# Patient Record
Sex: MALE | Race: WHITE | NOT HISPANIC OR LATINO
[De-identification: names, ages, dates, MRNs, and addresses within clinical notes are randomized per-mention and may not be internally consistent; named-entity substitution may affect disease eponyms.]

---

## 2017-08-30 VITALS
TEMPERATURE: 98 F | OXYGEN SATURATION: 97 % | DIASTOLIC BLOOD PRESSURE: 75 MMHG | HEIGHT: 70 IN | WEIGHT: 190.04 LBS | HEART RATE: 78 BPM | RESPIRATION RATE: 16 BRPM | SYSTOLIC BLOOD PRESSURE: 120 MMHG

## 2017-08-31 ENCOUNTER — RESULT REVIEW (OUTPATIENT)
Age: 37
End: 2017-08-31

## 2017-08-31 ENCOUNTER — INPATIENT (INPATIENT)
Facility: HOSPITAL | Age: 37
LOS: 3 days | Discharge: HOME | DRG: 454 | End: 2017-09-04
Attending: SPECIALIST | Admitting: SPECIALIST
Payer: COMMERCIAL

## 2017-08-31 DIAGNOSIS — Z98.890 OTHER SPECIFIED POSTPROCEDURAL STATES: Chronic | ICD-10-CM

## 2017-08-31 DIAGNOSIS — M48.07 SPINAL STENOSIS, LUMBOSACRAL REGION: ICD-10-CM

## 2017-08-31 DIAGNOSIS — F41.1 GENERALIZED ANXIETY DISORDER: ICD-10-CM

## 2017-08-31 DIAGNOSIS — M54.30 SCIATICA, UNSPECIFIED SIDE: ICD-10-CM

## 2017-08-31 DIAGNOSIS — Z90.49 ACQUIRED ABSENCE OF OTHER SPECIFIED PARTS OF DIGESTIVE TRACT: Chronic | ICD-10-CM

## 2017-08-31 DIAGNOSIS — D69.3 IMMUNE THROMBOCYTOPENIC PURPURA: ICD-10-CM

## 2017-08-31 LAB
ANION GAP SERPL CALC-SCNC: 11 MMOL/L — SIGNIFICANT CHANGE UP (ref 5–17)
BASOPHILS NFR BLD AUTO: 0.1 % — SIGNIFICANT CHANGE UP (ref 0–2)
BUN SERPL-MCNC: 10 MG/DL — SIGNIFICANT CHANGE UP (ref 7–23)
CALCIUM SERPL-MCNC: 8.5 MG/DL — SIGNIFICANT CHANGE UP (ref 8.4–10.5)
CHLORIDE SERPL-SCNC: 98 MMOL/L — SIGNIFICANT CHANGE UP (ref 96–108)
CO2 SERPL-SCNC: 25 MMOL/L — SIGNIFICANT CHANGE UP (ref 22–31)
CREAT SERPL-MCNC: 0.7 MG/DL — SIGNIFICANT CHANGE UP (ref 0.5–1.3)
EOSINOPHIL NFR BLD AUTO: 0.3 % — SIGNIFICANT CHANGE UP (ref 0–6)
GLUCOSE SERPL-MCNC: 125 MG/DL — HIGH (ref 70–99)
HCT VFR BLD CALC: 39.9 % — SIGNIFICANT CHANGE UP (ref 39–50)
HGB BLD-MCNC: 14.4 G/DL — SIGNIFICANT CHANGE UP (ref 13–17)
LYMPHOCYTES # BLD AUTO: 8.5 % — LOW (ref 13–44)
MCHC RBC-ENTMCNC: 29.3 PG — SIGNIFICANT CHANGE UP (ref 27–34)
MCHC RBC-ENTMCNC: 36.1 G/DL — HIGH (ref 32–36)
MCV RBC AUTO: 81.1 FL — SIGNIFICANT CHANGE UP (ref 80–100)
MONOCYTES NFR BLD AUTO: 1.5 % — LOW (ref 2–14)
NEUTROPHILS NFR BLD AUTO: 89.6 % — HIGH (ref 43–77)
PLATELET # BLD AUTO: 169 K/UL — SIGNIFICANT CHANGE UP (ref 150–400)
POTASSIUM SERPL-MCNC: 4.7 MMOL/L — SIGNIFICANT CHANGE UP (ref 3.5–5.3)
POTASSIUM SERPL-SCNC: 4.7 MMOL/L — SIGNIFICANT CHANGE UP (ref 3.5–5.3)
RBC # BLD: 4.92 M/UL — SIGNIFICANT CHANGE UP (ref 4.2–5.8)
RBC # FLD: 12.8 % — SIGNIFICANT CHANGE UP (ref 10.3–16.9)
SODIUM SERPL-SCNC: 134 MMOL/L — LOW (ref 135–145)
WBC # BLD: 18.1 K/UL — HIGH (ref 3.8–10.5)
WBC # FLD AUTO: 18.1 K/UL — HIGH (ref 3.8–10.5)

## 2017-08-31 PROCEDURE — 15734 MUSCLE-SKIN GRAFT TRUNK: CPT | Mod: LT

## 2017-08-31 RX ORDER — MORPHINE SULFATE 50 MG/1
2 CAPSULE, EXTENDED RELEASE ORAL
Qty: 0 | Refills: 0 | Status: DISCONTINUED | OUTPATIENT
Start: 2017-08-31 | End: 2017-09-01

## 2017-08-31 RX ORDER — HYDROMORPHONE HYDROCHLORIDE 2 MG/ML
0.5 INJECTION INTRAMUSCULAR; INTRAVENOUS; SUBCUTANEOUS
Qty: 0 | Refills: 0 | Status: DISCONTINUED | OUTPATIENT
Start: 2017-08-31 | End: 2017-09-01

## 2017-08-31 RX ORDER — CEFAZOLIN SODIUM 1 G
2000 VIAL (EA) INJECTION EVERY 8 HOURS
Qty: 0 | Refills: 0 | Status: COMPLETED | OUTPATIENT
Start: 2017-08-31 | End: 2017-09-01

## 2017-08-31 RX ORDER — HYDROMORPHONE HYDROCHLORIDE 2 MG/ML
30 INJECTION INTRAMUSCULAR; INTRAVENOUS; SUBCUTANEOUS
Qty: 0 | Refills: 0 | Status: DISCONTINUED | OUTPATIENT
Start: 2017-08-31 | End: 2017-09-01

## 2017-08-31 RX ORDER — MORPHINE SULFATE 50 MG/1
4 CAPSULE, EXTENDED RELEASE ORAL
Qty: 0 | Refills: 0 | Status: DISCONTINUED | OUTPATIENT
Start: 2017-08-31 | End: 2017-09-01

## 2017-08-31 RX ORDER — ONDANSETRON 8 MG/1
4 TABLET, FILM COATED ORAL EVERY 6 HOURS
Qty: 0 | Refills: 0 | Status: DISCONTINUED | OUTPATIENT
Start: 2017-08-31 | End: 2017-09-04

## 2017-08-31 RX ORDER — BUPIVACAINE 13.3 MG/ML
20 INJECTION, SUSPENSION, LIPOSOMAL INFILTRATION ONCE
Qty: 0 | Refills: 0 | Status: DISCONTINUED | OUTPATIENT
Start: 2017-08-31 | End: 2017-08-31

## 2017-08-31 RX ORDER — SODIUM CHLORIDE 9 MG/ML
1000 INJECTION, SOLUTION INTRAVENOUS
Qty: 0 | Refills: 0 | Status: DISCONTINUED | OUTPATIENT
Start: 2017-08-31 | End: 2017-09-03

## 2017-08-31 RX ORDER — ACETAMINOPHEN 500 MG
975 TABLET ORAL EVERY 8 HOURS
Qty: 0 | Refills: 0 | Status: DISCONTINUED | OUTPATIENT
Start: 2017-08-31 | End: 2017-09-04

## 2017-08-31 RX ORDER — NALOXONE HYDROCHLORIDE 4 MG/.1ML
0.1 SPRAY NASAL
Qty: 0 | Refills: 0 | Status: DISCONTINUED | OUTPATIENT
Start: 2017-08-31 | End: 2017-09-04

## 2017-08-31 RX ADMIN — HYDROMORPHONE HYDROCHLORIDE 30 MILLILITER(S): 2 INJECTION INTRAMUSCULAR; INTRAVENOUS; SUBCUTANEOUS at 19:24

## 2017-08-31 RX ADMIN — HYDROMORPHONE HYDROCHLORIDE 0.5 MILLIGRAM(S): 2 INJECTION INTRAMUSCULAR; INTRAVENOUS; SUBCUTANEOUS at 19:22

## 2017-08-31 RX ADMIN — HYDROMORPHONE HYDROCHLORIDE 0.5 MILLIGRAM(S): 2 INJECTION INTRAMUSCULAR; INTRAVENOUS; SUBCUTANEOUS at 22:45

## 2017-08-31 RX ADMIN — Medication 975 MILLIGRAM(S): at 22:48

## 2017-08-31 RX ADMIN — SODIUM CHLORIDE 80 MILLILITER(S): 9 INJECTION, SOLUTION INTRAVENOUS at 19:32

## 2017-08-31 RX ADMIN — HYDROMORPHONE HYDROCHLORIDE 0.5 MILLIGRAM(S): 2 INJECTION INTRAMUSCULAR; INTRAVENOUS; SUBCUTANEOUS at 19:00

## 2017-08-31 NOTE — H&P ADULT - PROBLEM SELECTOR PLAN 1
Admit to Orthopedics  Medically optimized by Dr. Hannah and hematology Dr Strickland  For Anterior to posterior spinal fusion L5-S1 with bone graft today

## 2017-08-31 NOTE — H&P ADULT - HISTORY OF PRESENT ILLNESS
37M with low back pain x  Ambulates with   Has hematologist for history of ITP which is noted to be resolved due to decadron tx.  Presents today for Ant-Post spinal fusion with instrumentation. 37M with low back pain x 8 years which that began spontaneously and without accident or trauma. Pt reports that he has undergone multiple epidural injections over the years without lasting pain relief. He eventually underwent discectomy and laminectomy for a herniated disc in May 2016 with some improvement of his pain. However, his pain continues to progressively worsen without improvement and has become increasingly unresponsive to conservative management. Pt states the pain is constant without radiation and is exacerbated with prolonged sitting or standing. Positional changes also exacerbate pain. Pt ambulates without assistance at home. Denies weakness, numbness/tingling of extremities, F/C/N/V, CP, SOB today.     Of note, pt has hematologist for history of ITP which is noted to be resolved due to pre-operative high dose steroids (decadron) tx.    Presents today for Ant-Post spinal fusion with instrumentation.

## 2017-08-31 NOTE — PROVIDER CONTACT NOTE (OTHER) - ASSESSMENT
Pt's bowel sounds were hypoactive upon ausculation and is s/p ALIF L5-S1. Pt requested something to drink. Tylenol 975mg po also ordered for 10PM.

## 2017-08-31 NOTE — CONSULT NOTE ADULT - SUBJECTIVE AND OBJECTIVE BOX
Pain Management Consult Note - [facility] [phone]    Chief Complaint:  Back pain    HPI:  38 y/o male with back pain going for ASF L5-S1 and PSF L5-S1 today.  States he was taking percocet 5/325 2-3 times/week.  States the pain is in the back and is nonradiating.  Denies weakness or numbness in the lower extremities.      Pain is ___ sharp ____dull ___burning __x_achy ___ Intensity: ____ mild ___mod ___severe     Location ____surgical site ____cervical __x___lumbar ____abd ____upper ext____lower ext    Worse with __x__activity __x__movement _____physical therapy___ Rest    Improved with __x__medication ___x_rest ____physical therapy    ROS: Const:  _-__febrile   Eyes:___ENT:___CV: __-_chest pain  Resp: __-__sob  GI:_-__nausea __-_vomiting -___abd pain _+__npo ___clears __full diet __bm  :_-__ Musk: __+_pain ___spasm  Skin:___ Neuro:  _-__rqtgjsmu__-_wimvduzxv___- numbness __-_weakness ___paresth  Psych:__anxiety  Endo:_-__ Heme:_+ ITP__Allergy:___Tylenol with codeine-abd pain______, ___all others reviewed and negative    PAST MEDICAL & SURGICAL HISTORY:  Purpura, idiopathic thrombocytopenia  Lumbosacral stenosis  History of microdiscectomy  History of cholecystectomy      SH: ___Tobacco   ___Alcohol                          FH:FAMILY HISTORY:          T(C): --  HR: --  BP: --  RR: --  SpO2: --  Wt(kg): --    T(C): --  HR: --  BP: --  RR: --  SpO2: --  Wt(kg): --    T(C): --  HR: --  BP: --  RR: --  SpO2: --  Wt(kg): --    PHYSICAL EXAM:  Gen Appearance: _x__no acute distress _x__appropriate        Neuro: ___SILT feet__x__ EOM Intact Psych: AAOX_3_, _x__mood/affect appropriate        Eyes: x___conjunctiva WNL  ___x__ Pupils equal and round        ENT: _x__ears and nose atraumatic__x_ Hearing grossly intact        Neck: ___trachea midline, no visible masses ___thyroid without palpable mass    Resp: _x__Nml WOB____No tactile fremitus ___clear to auscultation    Cardio: _x__extremities free from edema ____pedal pulses palpable    GI/Abdomen: ___soft _____ Nontender______Nondistended_____HSM    Lymphatic: ___no palpable nodes in neck  ___no palpable nodes calves and feet    Skin/Wound: ___Incision, ___Dressing c/d/i,   ____surrounding tissues soft,  ___drain/chest tube present____    Muscular: EHL ___/5  Gastrocnemius___/5    _x__absent clubbing/cyanosis      ASSESSMENT: This is a 37y old Male with a history of   LUMBO SACRAL STENOSIS  No h/o HF  Purpura, idiopathic thrombocytopenia  Anticipatory anxiety: Anticipatory anxiety  Sciatica, unspecified laterality: Sciatica, unspecified laterality  Purpura, idiopathic thrombocytopenia: Purpura, idiopathic thrombocytopenia  Lumbosacral stenosis: Lumbosacral stenosis  History of microdiscectomy  History of cholecystectomy  and back pain going for ASF L5-S1 and PSF L5-S1 today.    Recommended Treatment PLAN:  1.  Tylenol 975 mg po q8h  2.  Dilaudid PCA 0/0.2mg/6min with 0.5 mg IV q2h prn [facility] [phone]    Chief Complaint:  Back pain    HPI:  38 y/o male with back pain going for ASF L5-S1 and PSF L5-S1 today.  States he was taking percocet 5/325 2-3 times/week.  States the pain is in the back and is nonradiating.  Denies weakness or numbness in the lower extremities.      Pain is ___ sharp ____dull ___burning __x_achy ___ Intensity: ____ mild ___mod ___severe     Location ____surgical site ____cervical __x___lumbar ____abd ____upper ext____lower ext    Worse with __x__activity __x__movement _____physical therapy___ Rest    Improved with __x__medication ___x_rest ____physical therapy    ROS: Const:  _-__febrile   Eyes:___ENT:___CV: __-_chest pain  Resp: __-__sob  GI:_-__nausea __-_vomiting -___abd pain _+__npo ___clears __full diet __bm  :_-__ Musk: __+_pain ___spasm  Skin:___ Neuro:  _-__rnuzuxgc__-_vxggtbzxi___- numbness __-_weakness ___paresth  Psych:__anxiety  Endo:_-__ Heme:_+ ITP__Allergy:___Tylenol with codeine-abd pain______, ___all others reviewed and negative    PAST MEDICAL & SURGICAL HISTORY:  Purpura, idiopathic thrombocytopenia  Lumbosacral stenosis  History of microdiscectomy  History of cholecystectomy      SH: denies___Tobacco   denies___Alcohol                          FH:FAMILY HISTORY:  None          T(C): --97.8 oral  HR: --78  BP: --120/75  RR: --16  SpO2: --97%  Wt(kg): --    T(C): --  HR: --  BP: --  RR: --  SpO2: --  Wt(kg): --    T(C): --  HR: --  BP: --  RR: --  SpO2: --  Wt(kg): --    PHYSICAL EXAM:  Gen Appearance: _x__no acute distress _x__appropriate        Neuro: ___SILT feet__x__ EOM Intact Psych: AAOX_3_, _x__mood/affect appropriate        Eyes: x___conjunctiva WNL  ___x__ Pupils equal and round        ENT: _x__ears and nose atraumatic__x_ Hearing grossly intact        Neck: ___trachea midline, no visible masses ___thyroid without palpable mass    Resp: _x__Nml WOB____No tactile fremitus ___clear to auscultation    Cardio: _x__extremities free from edema __5__pedal pulses palpable    GI/Abdomen: _x__soft __x__ Nontender______Nondistended_____HSM    Lymphatic: ___no palpable nodes in neck  ___no palpable nodes calves and feet    Skin/Wound: ___Incision, ___Dressing c/d/i,   ____surrounding tissues soft,  ___drain/chest tube present____    Muscular: EHL _5__/5  Gastrocnemius___/5    _x__absent clubbing/cyanosis      ASSESSMENT: This is a 37y old Male with a history of   LUMBO SACRAL STENOSIS  No h/o HF  Purpura, idiopathic thrombocytopenia  Anticipatory anxiety: Anticipatory anxiety  Sciatica, unspecified laterality: Sciatica, unspecified laterality  Purpura, idiopathic thrombocytopenia: Purpura, idiopathic thrombocytopenia  Lumbosacral stenosis: Lumbosacral stenosis  History of microdiscectomy  History of cholecystectomy  and back pain going for ASF L5-S1 and PSF L5-S1 today.    Recommended Treatment PLAN:  1.  Tylenol 975 mg po q8h  2.  Dilaudid PCA 0/0.2mg/6min with 0.5 mg IV q2h prn

## 2017-08-31 NOTE — H&P ADULT - NSHPSOCIALHISTORY_GEN_ALL_CORE
Quit smoking cigarettes in 2013  Moderate alcohol use ETOH: Moderate alcohol use, 2-3 glass wine/week.  Tobacco: Former light social smoker, Quit smoking cigarettes in 2013  Drugs: Denies

## 2017-08-31 NOTE — H&P ADULT - NSHPPHYSICALEXAM_GEN_ALL_CORE
PE: Normal head, atraumatic. Normal skin, no rashes/lesions/erythema.  MSK: Decreased ROM to lumbar spine secondary to pain.   Rest of PE per medical clearance. Gen: Pt seated in chair in NAD   Skin: Warm, color normal, no wounds, rashes.  Back: tenderness to palpation to lumbar spine.  Extremities: No clubbing, no cyanosis, no edema.  Musculoskeletal: Decreased ROM secondary to pain, lumbar spine. Sensation intact to light touch to bilateral LE distally. Motor Strength 5/5 to TA/GS/EHL/FHL bilaterally.  Vascular: DP/PT 2+, Brisk cap refill, Toes wwp     Remainder of exam as per medical clearance note

## 2017-08-31 NOTE — PROGRESS NOTE ADULT - SUBJECTIVE AND OBJECTIVE BOX
Ortho Post Op Check    Procedure: Ant. Post L5-S1 fusion  Surgeon: Aisha  Laterality:    Pt comfortable without complaints, pain controlled  Denies CP, SOB, N/V, numbness/tingling     Vital Signs Last 24 Hrs  T(C): 36.9 (08-31-17 @ 21:40), Max: 37.1 (08-31-17 @ 21:00)  T(F): 98.4 (08-31-17 @ 21:40), Max: 98.7 (08-31-17 @ 21:00)  HR: 98 (08-31-17 @ 21:40) (86 - 98)  BP: 154/83 (08-31-17 @ 21:40) (121/80 - 154/83)  BP(mean): 100 (08-31-17 @ 19:45) (100 - 114)  RR: 12 (08-31-17 @ 21:40) (12 - 18)  SpO2: 98% (08-31-17 @ 21:40) (10% - 100%)  AVSS    General: Pt Alert and oriented, NAD  NAD, non labored respirations  Affected extremity:          Dressing: clean/dry/intact          Drains:1         Sensation: [x ] intact to light touch  [ ] decreased                              Vascular: [x ] warm well perfused; capillary refill <3 seconds          Motor exam: [x ]         [ ] Upper extremity                   Nae (c5)   Bi(c5/6)   WE(c6)   EE(c7)    (c8)                                                R           5              5            5             5             5                                               L            5              5            5             5            5         [x ] Lower extremity                   IP(L2)     Q(L3)     TA(L4)     EHL(L5)     GS(s1)                                                 R          5           5          5            5              5                                               L           5           5         5             5              5                            14.4   18.1  )-----------( 169      ( 31 Aug 2017 18:43 )             39.9   31 Aug 2017 18:43    134    |  98     |  10     ----------------------------<  125    4.7     |  25     |  0.70     Ca    8.5        31 Aug 2017 18:43        Post-op X-Ray: Implant in good position    A/P: 37yMale POD#0 s/p   - Stable  - Pain Control  - DVT ppx: SCDs  - Post op abx: Ancef  - PT, WBS: WBAT    Ortho Pager [phone]

## 2017-08-31 NOTE — H&P ADULT - NSHPLABSRESULTS_GEN_ALL_CORE
Preop CBC, BMP, PT/PTT/INR, UA within normal limits- reviewed by medical clearance.   Preop EKG: SR, RBBB, reviewed by medical clearance.

## 2017-08-31 NOTE — CONSULT NOTE ADULT - SUBJECTIVE AND OBJECTIVE BOX
HPI:  37 year old male  with low back pain x years with sciatica.  MRI confirmed diagnosis lumbar spinal stenosis.  Patient ambulates with difficulty and symptoms worse after prolonged imobility and in car.     Has hematologist for history of ITP which is noted to be resolved with  decadron rx.  Presents today for Ant-Post spinal fusion with instrumentation. (31 Aug 2017 08:10)      PAST MEDICAL & SURGICAL HISTORY:    Purpura, idiopathic thrombocytopenia  Lumbosacral stenosis  Denies DM HBP PUD ASTHMA  allergic rhinitis  History of microdiscectomy  History of cholecystectomy      REVIEW OF SYSTEMS    General:  normal	  Skin/Breast: normal  Ophthalmologic: negative  ENMT:	normal  Respiratory and Thorax: normal  Cardiovascular:	normal  Gastrointestinal:	normal  Genitourinary:	normal  Musculoskeletal: normal	   Neurological:	low back pain  Psychiatric:	normal  Hematology/Lymphatics:	negative  Endocrine:	negative  Allergic/Immunologic:	negative      MEDICATIONS      Percocet 5/325 oral tablet: 1 tab(s) orally every 6 hours, As Needed      Allergies    No Known Allergies    SOCIAL HISTORY: cigs quit 2013  alcohol socially    FAMILY HISTORY: non contributory      PHYSICAL EXAM:     Vital Signs Last 24 Hrs  T(C): --  T(F): --98.6  HR: --72  BP: --120/80  BP(mean): --  RR: --16  SpO2: --    Constitutional: WDWNM in NAD  Eyes: conj pink  ENMT: negative  Neck: supple  Breasts: not examined   Back: negative  Respiratory: clear to P&A  Cardiovascular: no MRGT or H  Gastrointestinal: normal bowel sounds  Genitourinary: neg  Rectal: not examined  Extremities: normal  Vascular: normal  Neurological: normal  Skin: negative  Lymph Nodes: negative  Musculoskeletal:  normal  Psychiatric: anxiety

## 2017-09-01 ENCOUNTER — TRANSCRIPTION ENCOUNTER (OUTPATIENT)
Age: 37
End: 2017-09-01

## 2017-09-01 DIAGNOSIS — F41.9 ANXIETY DISORDER, UNSPECIFIED: ICD-10-CM

## 2017-09-01 LAB
ANION GAP SERPL CALC-SCNC: 13 MMOL/L — SIGNIFICANT CHANGE UP (ref 5–17)
BASOPHILS NFR BLD AUTO: 0 % — SIGNIFICANT CHANGE UP (ref 0–2)
BUN SERPL-MCNC: 14 MG/DL — SIGNIFICANT CHANGE UP (ref 7–23)
CALCIUM SERPL-MCNC: 8.9 MG/DL — SIGNIFICANT CHANGE UP (ref 8.4–10.5)
CHLORIDE SERPL-SCNC: 95 MMOL/L — LOW (ref 96–108)
CO2 SERPL-SCNC: 25 MMOL/L — SIGNIFICANT CHANGE UP (ref 22–31)
CREAT SERPL-MCNC: 0.8 MG/DL — SIGNIFICANT CHANGE UP (ref 0.5–1.3)
EOSINOPHIL NFR BLD AUTO: 0 % — SIGNIFICANT CHANGE UP (ref 0–6)
GLUCOSE SERPL-MCNC: 147 MG/DL — HIGH (ref 70–99)
GRAM STN FLD: SIGNIFICANT CHANGE UP
HCT VFR BLD CALC: 34.5 % — LOW (ref 39–50)
HGB BLD-MCNC: 12.5 G/DL — LOW (ref 13–17)
LYMPHOCYTES # BLD AUTO: 4.9 % — LOW (ref 13–44)
MCHC RBC-ENTMCNC: 29.1 PG — SIGNIFICANT CHANGE UP (ref 27–34)
MCHC RBC-ENTMCNC: 36.2 G/DL — HIGH (ref 32–36)
MCV RBC AUTO: 80.4 FL — SIGNIFICANT CHANGE UP (ref 80–100)
MONOCYTES NFR BLD AUTO: 9 % — SIGNIFICANT CHANGE UP (ref 2–14)
NEUTROPHILS NFR BLD AUTO: 86.1 % — HIGH (ref 43–77)
PLATELET # BLD AUTO: 204 K/UL — SIGNIFICANT CHANGE UP (ref 150–400)
POTASSIUM SERPL-MCNC: 4.3 MMOL/L — SIGNIFICANT CHANGE UP (ref 3.5–5.3)
POTASSIUM SERPL-SCNC: 4.3 MMOL/L — SIGNIFICANT CHANGE UP (ref 3.5–5.3)
RBC # BLD: 4.29 M/UL — SIGNIFICANT CHANGE UP (ref 4.2–5.8)
RBC # FLD: 12.8 % — SIGNIFICANT CHANGE UP (ref 10.3–16.9)
SODIUM SERPL-SCNC: 133 MMOL/L — LOW (ref 135–145)
SPECIMEN SOURCE: SIGNIFICANT CHANGE UP
WBC # BLD: 17.9 K/UL — HIGH (ref 3.8–10.5)
WBC # FLD AUTO: 17.9 K/UL — HIGH (ref 3.8–10.5)

## 2017-09-01 RX ORDER — OXYCODONE HYDROCHLORIDE 5 MG/1
5 TABLET ORAL EVERY 4 HOURS
Qty: 0 | Refills: 0 | Status: DISCONTINUED | OUTPATIENT
Start: 2017-09-01 | End: 2017-09-04

## 2017-09-01 RX ORDER — CYCLOBENZAPRINE HYDROCHLORIDE 10 MG/1
10 TABLET, FILM COATED ORAL THREE TIMES A DAY
Qty: 0 | Refills: 0 | Status: DISCONTINUED | OUTPATIENT
Start: 2017-09-01 | End: 2017-09-01

## 2017-09-01 RX ORDER — HYDROMORPHONE HYDROCHLORIDE 2 MG/ML
1 INJECTION INTRAMUSCULAR; INTRAVENOUS; SUBCUTANEOUS ONCE
Qty: 0 | Refills: 0 | Status: DISCONTINUED | OUTPATIENT
Start: 2017-09-01 | End: 2017-09-01

## 2017-09-01 RX ORDER — DIAZEPAM 5 MG
5 TABLET ORAL EVERY 8 HOURS
Qty: 0 | Refills: 0 | Status: DISCONTINUED | OUTPATIENT
Start: 2017-09-01 | End: 2017-09-04

## 2017-09-01 RX ORDER — SIMETHICONE 80 MG/1
80 TABLET, CHEWABLE ORAL EVERY 8 HOURS
Qty: 0 | Refills: 0 | Status: DISCONTINUED | OUTPATIENT
Start: 2017-09-01 | End: 2017-09-04

## 2017-09-01 RX ORDER — MORPHINE SULFATE 50 MG/1
15 CAPSULE, EXTENDED RELEASE ORAL EVERY 12 HOURS
Qty: 0 | Refills: 0 | Status: DISCONTINUED | OUTPATIENT
Start: 2017-09-01 | End: 2017-09-04

## 2017-09-01 RX ORDER — HYDROMORPHONE HYDROCHLORIDE 2 MG/ML
1 INJECTION INTRAMUSCULAR; INTRAVENOUS; SUBCUTANEOUS
Qty: 0 | Refills: 0 | Status: DISCONTINUED | OUTPATIENT
Start: 2017-09-01 | End: 2017-09-04

## 2017-09-01 RX ORDER — METOCLOPRAMIDE HCL 10 MG
5 TABLET ORAL EVERY 6 HOURS
Qty: 0 | Refills: 0 | Status: DISCONTINUED | OUTPATIENT
Start: 2017-09-01 | End: 2017-09-04

## 2017-09-01 RX ORDER — OXYCODONE HYDROCHLORIDE 5 MG/1
10 TABLET ORAL EVERY 4 HOURS
Qty: 0 | Refills: 0 | Status: DISCONTINUED | OUTPATIENT
Start: 2017-09-01 | End: 2017-09-04

## 2017-09-01 RX ORDER — MORPHINE SULFATE 50 MG/1
4 CAPSULE, EXTENDED RELEASE ORAL ONCE
Qty: 0 | Refills: 0 | Status: DISCONTINUED | OUTPATIENT
Start: 2017-09-01 | End: 2017-09-01

## 2017-09-01 RX ORDER — METOCLOPRAMIDE HCL 10 MG
5 TABLET ORAL EVERY 6 HOURS
Qty: 0 | Refills: 0 | Status: DISCONTINUED | OUTPATIENT
Start: 2017-09-01 | End: 2017-09-01

## 2017-09-01 RX ADMIN — HYDROMORPHONE HYDROCHLORIDE 1 MILLIGRAM(S): 2 INJECTION INTRAMUSCULAR; INTRAVENOUS; SUBCUTANEOUS at 07:14

## 2017-09-01 RX ADMIN — HYDROMORPHONE HYDROCHLORIDE 1 MILLIGRAM(S): 2 INJECTION INTRAMUSCULAR; INTRAVENOUS; SUBCUTANEOUS at 11:56

## 2017-09-01 RX ADMIN — Medication 975 MILLIGRAM(S): at 13:25

## 2017-09-01 RX ADMIN — HYDROMORPHONE HYDROCHLORIDE 1 MILLIGRAM(S): 2 INJECTION INTRAMUSCULAR; INTRAVENOUS; SUBCUTANEOUS at 09:29

## 2017-09-01 RX ADMIN — HYDROMORPHONE HYDROCHLORIDE 1 MILLIGRAM(S): 2 INJECTION INTRAMUSCULAR; INTRAVENOUS; SUBCUTANEOUS at 13:39

## 2017-09-01 RX ADMIN — SODIUM CHLORIDE 80 MILLILITER(S): 9 INJECTION, SOLUTION INTRAVENOUS at 18:30

## 2017-09-01 RX ADMIN — Medication 975 MILLIGRAM(S): at 21:15

## 2017-09-01 RX ADMIN — MORPHINE SULFATE 15 MILLIGRAM(S): 50 CAPSULE, EXTENDED RELEASE ORAL at 10:10

## 2017-09-01 RX ADMIN — MORPHINE SULFATE 4 MILLIGRAM(S): 50 CAPSULE, EXTENDED RELEASE ORAL at 05:52

## 2017-09-01 RX ADMIN — HYDROMORPHONE HYDROCHLORIDE 1 MILLIGRAM(S): 2 INJECTION INTRAMUSCULAR; INTRAVENOUS; SUBCUTANEOUS at 14:35

## 2017-09-01 RX ADMIN — Medication 5 MILLIGRAM(S): at 02:20

## 2017-09-01 RX ADMIN — HYDROMORPHONE HYDROCHLORIDE 1 MILLIGRAM(S): 2 INJECTION INTRAMUSCULAR; INTRAVENOUS; SUBCUTANEOUS at 08:05

## 2017-09-01 RX ADMIN — Medication 100 MILLIGRAM(S): at 00:36

## 2017-09-01 RX ADMIN — MORPHINE SULFATE 15 MILLIGRAM(S): 50 CAPSULE, EXTENDED RELEASE ORAL at 21:16

## 2017-09-01 RX ADMIN — HYDROMORPHONE HYDROCHLORIDE 1 MILLIGRAM(S): 2 INJECTION INTRAMUSCULAR; INTRAVENOUS; SUBCUTANEOUS at 18:18

## 2017-09-01 RX ADMIN — MORPHINE SULFATE 15 MILLIGRAM(S): 50 CAPSULE, EXTENDED RELEASE ORAL at 22:16

## 2017-09-01 RX ADMIN — SODIUM CHLORIDE 80 MILLILITER(S): 9 INJECTION, SOLUTION INTRAVENOUS at 06:05

## 2017-09-01 RX ADMIN — MORPHINE SULFATE 15 MILLIGRAM(S): 50 CAPSULE, EXTENDED RELEASE ORAL at 11:00

## 2017-09-01 RX ADMIN — OXYCODONE HYDROCHLORIDE 10 MILLIGRAM(S): 5 TABLET ORAL at 20:46

## 2017-09-01 RX ADMIN — OXYCODONE HYDROCHLORIDE 10 MILLIGRAM(S): 5 TABLET ORAL at 10:00

## 2017-09-01 RX ADMIN — HYDROMORPHONE HYDROCHLORIDE 0.5 MILLIGRAM(S): 2 INJECTION INTRAMUSCULAR; INTRAVENOUS; SUBCUTANEOUS at 05:52

## 2017-09-01 RX ADMIN — HYDROMORPHONE HYDROCHLORIDE 1 MILLIGRAM(S): 2 INJECTION INTRAMUSCULAR; INTRAVENOUS; SUBCUTANEOUS at 18:48

## 2017-09-01 RX ADMIN — Medication 975 MILLIGRAM(S): at 05:04

## 2017-09-01 RX ADMIN — OXYCODONE HYDROCHLORIDE 10 MILLIGRAM(S): 5 TABLET ORAL at 13:24

## 2017-09-01 RX ADMIN — MORPHINE SULFATE 4 MILLIGRAM(S): 50 CAPSULE, EXTENDED RELEASE ORAL at 05:26

## 2017-09-01 RX ADMIN — HYDROMORPHONE HYDROCHLORIDE 1 MILLIGRAM(S): 2 INJECTION INTRAMUSCULAR; INTRAVENOUS; SUBCUTANEOUS at 09:14

## 2017-09-01 RX ADMIN — HYDROMORPHONE HYDROCHLORIDE 1 MILLIGRAM(S): 2 INJECTION INTRAMUSCULAR; INTRAVENOUS; SUBCUTANEOUS at 23:00

## 2017-09-01 RX ADMIN — Medication 5 MILLIGRAM(S): at 14:10

## 2017-09-01 RX ADMIN — Medication 100 MILLIGRAM(S): at 10:15

## 2017-09-01 RX ADMIN — ONDANSETRON 4 MILLIGRAM(S): 8 TABLET, FILM COATED ORAL at 01:28

## 2017-09-01 RX ADMIN — HYDROMORPHONE HYDROCHLORIDE 1 MILLIGRAM(S): 2 INJECTION INTRAMUSCULAR; INTRAVENOUS; SUBCUTANEOUS at 22:45

## 2017-09-01 RX ADMIN — CYCLOBENZAPRINE HYDROCHLORIDE 10 MILLIGRAM(S): 10 TABLET, FILM COATED ORAL at 06:10

## 2017-09-01 RX ADMIN — HYDROMORPHONE HYDROCHLORIDE 0.5 MILLIGRAM(S): 2 INJECTION INTRAMUSCULAR; INTRAVENOUS; SUBCUTANEOUS at 00:45

## 2017-09-01 RX ADMIN — Medication 5 MILLIGRAM(S): at 05:09

## 2017-09-01 RX ADMIN — SIMETHICONE 80 MILLIGRAM(S): 80 TABLET, CHEWABLE ORAL at 19:46

## 2017-09-01 RX ADMIN — OXYCODONE HYDROCHLORIDE 10 MILLIGRAM(S): 5 TABLET ORAL at 19:46

## 2017-09-01 RX ADMIN — OXYCODONE HYDROCHLORIDE 10 MILLIGRAM(S): 5 TABLET ORAL at 13:54

## 2017-09-01 RX ADMIN — OXYCODONE HYDROCHLORIDE 10 MILLIGRAM(S): 5 TABLET ORAL at 08:58

## 2017-09-01 RX ADMIN — HYDROMORPHONE HYDROCHLORIDE 1 MILLIGRAM(S): 2 INJECTION INTRAMUSCULAR; INTRAVENOUS; SUBCUTANEOUS at 14:20

## 2017-09-01 RX ADMIN — HYDROMORPHONE HYDROCHLORIDE 0.5 MILLIGRAM(S): 2 INJECTION INTRAMUSCULAR; INTRAVENOUS; SUBCUTANEOUS at 04:26

## 2017-09-01 NOTE — PHYSICAL THERAPY INITIAL EVALUATION ADULT - ADDITIONAL COMMENTS
Patient states that he lives in an elevator building with no steps to enter. States that his  and mother and law will be available to assist upon discharge.

## 2017-09-01 NOTE — PROGRESS NOTE ADULT - SUBJECTIVE AND OBJECTIVE BOX
HPI:  37 year old male  with low back pain x years with sciatica.  MRI confirmed diagnosis lumbar spinal stenosis.  Patient ambulates with difficulty and symptoms worse after prolonged imobility and in car.  Patient day #1 post op anterior posterior spinal fusion with instrumentation.  Patient with nausea and severe pain post op including stabbing abdominal pain seen in pain management by Dr. Goodwin.     Has hematologist for history of ITP which is noted to be resolved with  decadron rx.  Patient will be seen by [facility] Dr. Gomez today.     PAST MEDICAL & SURGICAL HISTORY:  Purpura, idiopathic thrombocytopenia  Denies DM PUD ASTHMA HBP  allergic rhinitis  Lumbosacral stenosis  History of microdiscectomy  History of cholecystectomy      MEDICATIONS  (STANDING):  HYDROmorphone PCA (1 mG/mL) 30 milliLiter(s) PCA Continuous PCA Continuous  acetaminophen   Tablet 975 milliGRAM(s) Oral every 8 hours  lactated ringers. 1000 milliLiter(s) (80 mL/Hr) IV Continuous <Continuous>  ceFAZolin   IVPB 2000 milliGRAM(s) IV Intermittent every 8 hours    MEDICATIONS  (PRN):  HYDROmorphone  Injectable 0.5 milliGRAM(s) IV Push every 10 minutes PRN Severe Pain (7 - 10)  HYDROmorphone PCA (1 mG/mL) Rescue Clinician Bolus 0.5 milliGRAM(s) IV Push every 2 hours PRN for Pain Scale GREATER THAN 6  naloxone Injectable 0.1 milliGRAM(s) IV Push every 3 minutes PRN For ANY of the following changes in patient status:  A. RR LESS THAN 10 breaths per minute, B. Oxygen saturation LESS THAN 90%, C. Sedation score of 6  ondansetron Injectable 4 milliGRAM(s) IV Push every 6 hours PRN Nausea  morphine  - Injectable 2 milliGRAM(s) IV Push every 30 minutes PRN Moderate Pain (4 - 6)  morphine  - Injectable 4 milliGRAM(s) IV Push every 15 minutes PRN Severe Pain (7 - 10)  diazepam    Tablet 5 milliGRAM(s) Oral every 8 hours PRN muscle spasm  metoclopramide Injectable 5 milliGRAM(s) IV Push every 6 hours PRN nausea and or vomiting  cyclobenzaprine 10 milliGRAM(s) Oral three times a day PRN Muscle Spasm      Allergies    No Known Allergies      REVIEW OF SYSTEMS    General:  malaise	  Skin/Breast: normal  Ophthalmologic: negative  ENMT:	normal  Respiratory and Thorax: normal  Cardiovascular:	normal  Gastrointestinal:	normal  Genitourinary:	normal  Musculoskeletal: normal	   Neurological:	low back pain  Psychiatric:	normal  Hematology/Lymphatics:	negative  Endocrine:	negative  Allergic/Immunologic:	negative      PHYSICAL EXAM:     Vital Signs Last 24 Hrs  T(C): 36.6 (01 Sep 2017 04:25), Max: 37.1 (31 Aug 2017 21:00)  T(F): 97.9 (01 Sep 2017 04:25), Max: 98.7 (31 Aug 2017 21:00)  HR: 93 (01 Sep 2017 04:25) (86 - 98)  BP: 120/78 (01 Sep 2017 04:25) (120/78 - 154/83)  BP(mean): 100 (31 Aug 2017 19:45) (100 - 114)  RR: 16 (01 Sep 2017 04:25) (12 - 18)  SpO2: 100% (01 Sep 2017 04:25) (10% - 100%)    Constitutional: WDWNM in NAD  Eyes: conj pink  ENMT: negative  Neck: supple  Breasts: not examined   Back: negative  Respiratory: clear to P&A  Cardiovascular: no MRGT or H  Gastrointestinal: normal bowel sounds  Genitourinary: neg  Rectal: not examined  Extremities: normal  Vascular: normal  Neurological: + SLR  Skin: negative  Lymph Nodes: negative  Musculoskeletal:   normal  Psychiatric: anxiety                          14.4   18.1  )-----------( 169      ( 31 Aug 2017 18:43 )             39.9       08-31    134<L>  |  98  |  10  ----------------------------<  125<H>  4.7   |  25  |  0.70    Ca    8.5      31 Aug 2017 18:43

## 2017-09-01 NOTE — PROVIDER CONTACT NOTE (OTHER) - ACTION/TREATMENT ORDERED:
Dilaudid 1mg IV push one time dose ordered and given. Dilaudid 1mg IV push one time dose ordered and given. Dr. Connelly stated he doesn't think pain is gas related.

## 2017-09-01 NOTE — PROGRESS NOTE ADULT - ASSESSMENT
37yoM with lumbosacral stenosis s/p posterior L5-S1 fusion with anterior exposure POD1    Pt does not have bowel function yet. Recommend NPO until flatus, PO medications okay POD1.  Aim to improve pain control, though with minimal narcotics if prolonged lack of BF  Appreciate care per primary team  Discussed with Dr. Benito

## 2017-09-01 NOTE — PROVIDER CONTACT NOTE (OTHER) - ASSESSMENT
Pt's is crying and screaming in excruciating pain. Pt stated it feels like his stomach is being stabbed. Pt denies any relief from PCA dilaudid or bolus.

## 2017-09-01 NOTE — DISCHARGE NOTE ADULT - CARE PROVIDER_API CALL
Julio Cesar Leonard), Orthopaedic Surgery  [facility] [place] [address]  Phone: [phone]  Fax: [phone]

## 2017-09-01 NOTE — PHYSICAL THERAPY INITIAL EVALUATION ADULT - GENERAL OBSERVATIONS, REHAB EVAL
Patient received supine in bed with + hemovac x 3, On-Q pain ball, IV.  Rayray at bedside. Reports "significant" pain both at rest and with activity.

## 2017-09-01 NOTE — DISCHARGE NOTE ADULT - CARE PLAN
Goal:	Improvement in pain and ambulation after surgery  Instructions for follow-up, activity and diet:	No strenuous activity (bending/twisting), heavy lifting, driving or returning to work until cleared by MD.  You may shower. Remove dressing daily before shower, pat the area dry gently then reapply dry gauze dressing x 5 days, then leave incision open to air.   Try to have regular bowel movements, take stool softener or laxative if necessary.  May take pepcid or zantac for upset stomach.  Ice affected areas to decrease swelling.  Call to schedule an appt with Dr. Leonard for follow up, if you have staples or sutures they will be removed in office.  Contact your doctor if you experience: fever greater than 101.5, chills, chest pain, difficulty breathing, redness or excessive drainage around the incision, other concerns. Principal Discharge DX:	Lumbosacral stenosis  Goal:	Improvement in pain and ambulation after surgery  Instructions for follow-up, activity and diet:	No strenuous activity (bending/twisting), heavy lifting, driving or returning to work until cleared by MD.  You may shower. Remove dressing daily before shower, pat the area dry gently then reapply dry gauze dressing x 5 days, then leave incision open to air.   Try to have regular bowel movements, take stool softener or laxative if necessary.  May take pepcid or zantac for upset stomach.  Ice affected areas to decrease swelling.  Call to schedule an appt with Dr. Leonard for follow up, if you have staples or sutures they will be removed in office.  Contact your doctor if you experience: fever greater than 101.5, chills, chest pain, difficulty breathing, redness or excessive drainage around the incision, other concerns.

## 2017-09-01 NOTE — PROGRESS NOTE ADULT - SUBJECTIVE AND OBJECTIVE BOX
Patient seen and examined at bedside.     SUBJECTIVE: No acute events overnight.  Pain tolerable.    Denies Chest Pain/SOB.    Denies Headache.    Denies Nausea/vomiting.      OBJECTIVE:   T(C): 36.6 (09-01-17 @ 04:25), Max: 37.1 (08-31-17 @ 21:00)  T(F): 97.9 (09-01-17 @ 04:25), Max: 98.7 (08-31-17 @ 21:00)  HR: 77 (09-01-17 @ 07:09) (77 - 98)  BP: 116/74 (09-01-17 @ 07:09) (116/74 - 154/83)  RR:  (12 - 18)  SpO2:  (10% - 100%)  Wt(kg): --    NAD, non labored respirations  Affected extremity:          Dressing: clean/dry/intact          Drains: none         Sensation: [x ] intact to light touch  [ ] decreased            Pain                  Vascular: [x ] warm well perfused; capillary refill <3 seconds          Motor exam: [x ]         [ ] Upper extremity                   Nae (c5)   Bi(c5/6)   WE(c6)   EE(c7)    (c8)                                                R           5              5            5             5             5                                               L            5              5            5             5            5         [x ] Lower extremity                   IP(L2)     Q(L3)     TA(L4)     EHL(L5)     GS(s1)                                                 R          5           5          5            5              5                                               L           5           5         5             5              5                                     12.5<L>  17.9<H> )-------------------( 204      ( 09-01 @ 06:42 )                 34.5<L>    I&O's Detail    31 Aug 2017 07:01  -  01 Sep 2017 07:00  --------------------------------------------------------  IN:    IV PiggyBack: 50 mL    lactated ringers.: 760 mL  Total IN: 810 mL    OUT:    Drain: 80 mL    Drain: 80 mL    Drain: 140 mL    Indwelling Catheter - Urethral: 1825 mL  Total OUT: 2125 mL    Total NET: -1315 mL          A/P :  37y Male s/p Ant Post L5-S1 PSF 8/31/17     -    Pain control + bowel regimen  -    DVT ppx: SCDs    -    Periop abx    -    ADAT  -    Check AM labs  -    Weight bearing status:   WBAT        -    Physical Therapy  -    Resume home meds  -    Dispo planning

## 2017-09-01 NOTE — PROVIDER CONTACT NOTE (OTHER) - ASSESSMENT
Pt stated "I'm feeling uncomfortable and my back is sore" despite receiving pca dilaudid and rescue bolus and repositioning pt.

## 2017-09-01 NOTE — DISCHARGE NOTE ADULT - HOSPITAL COURSE
Admit to Orthopaedic Service 8/31/17  OR ASF/PSF L5-S1  Perioperative Antibiotics   DVT ppx  PT   Pain management

## 2017-09-01 NOTE — PROGRESS NOTE ADULT - SUBJECTIVE AND OBJECTIVE BOX
STATUS POST:  L5-S1 posterior spinal fusion with anterior exposure POD1     SUBJECTIVE: Patient seen and examined bedside. Reports poorly controlled pain, not helped by morphine or PCA. Pain mostly in back and central abdomen. Reports not having passed flatus, BM. No nausea, vomiting.    ceFAZolin   IVPB 2000 milliGRAM(s) IV Intermittent every 8 hours      Vital Signs Last 24 Hrs  T(C): 36.6 (01 Sep 2017 04:25), Max: 37.1 (31 Aug 2017 21:00)  T(F): 97.9 (01 Sep 2017 04:25), Max: 98.7 (31 Aug 2017 21:00)  HR: 77 (01 Sep 2017 07:09) (77 - 98)  BP: 116/74 (01 Sep 2017 07:09) (116/74 - 154/83)  BP(mean): 100 (31 Aug 2017 19:45) (100 - 114)  RR: 16 (01 Sep 2017 07:09) (12 - 18)  SpO2: 97% (01 Sep 2017 07:09) (10% - 100%)  I&O's Detail    31 Aug 2017 07:01  -  01 Sep 2017 07:00  --------------------------------------------------------  IN:    IV PiggyBack: 50 mL    lactated ringers.: 760 mL  Total IN: 810 mL    OUT:    Drain: 80 mL    Drain: 80 mL    Drain: 140 mL    Indwelling Catheter - Urethral: 1825 mL  Total OUT: 2125 mL    Total NET: -1315 mL          General: NAD, resting comfortably in bed  Pulm: Nonlabored breathing, no respiratory distress on RA  Abd: soft, ND, mildly tender to palpation diffusely. Dressing in place, CDI  Extrem: WWP, no edema        LABS:                        12.5   17.9  )-----------( 204      ( 01 Sep 2017 06:42 )             34.5     09-01    133<L>  |  95<L>  |  14  ----------------------------<  147<H>  4.3   |  25  |  0.80    Ca    8.9      01 Sep 2017 06:42            RADIOLOGY & ADDITIONAL STUDIES:

## 2017-09-01 NOTE — PROGRESS NOTE ADULT - SUBJECTIVE AND OBJECTIVE BOX
ORTHO NOTE    [x] Pt seen/examined.  [ ] Pt without any complaints/in NAD.    [x] Pt complains of: Pain overnight, reports abdominal "stabbing pain" which has moderately resolved - pt states Valium provided relief; pt seen by Pain Management team this morning and will be making adjustments to regimen      ROS: [ ] Fever  [ ] Chills  [ ] CP [ ] SOB [ ] Dysnea  [ ] Palpitations [ ] Cough [ ] N/V/C/D [ ] Paresthia [ ] Other     [x] ROS  otherwise negative    .    PHYSICAL EXAM:    Vital Signs Last 24 Hrs  T(C): 36.3 (01 Sep 2017 09:02), Max: 37.1 (31 Aug 2017 21:00)  T(F): 97.4 (01 Sep 2017 09:02), Max: 98.7 (31 Aug 2017 21:00)  HR: 76 (01 Sep 2017 09:02) (76 - 98)  BP: 126/76 (01 Sep 2017 09:02) (116/74 - 154/83)  BP(mean): 100 (31 Aug 2017 19:45) (100 - 114)  RR: 16 (01 Sep 2017 09:02) (12 - 18)  SpO2: 99% (01 Sep 2017 09:02) (10% - 100%)    I&O's Detail    31 Aug 2017 07:01  -  01 Sep 2017 07:00  --------------------------------------------------------  IN:    IV PiggyBack: 50 mL    lactated ringers.: 760 mL  Total IN: 810 mL    OUT:    Drain: 80 mL    Drain: 80 mL    Drain: 140 mL    Indwelling Catheter - Urethral: 1825 mL  Total OUT: 2125 mL    Total NET: -1315 mL           CAPILLARY BLOOD GLUCOSE       Physical Exam:   Pt laying in bed comfortably; Abdomen soft with minimal tenderness to palpation  Surgical dressings clean/dry/in tact   EHL/FHL/TA/GS 5/5 motor strength bilateral lower extremities  SLT in tact and equal to distal bilateral lower extremities  DP pulses 2+ bilaterally     LABS                        12.5   17.9  )-----------( 204      ( 01 Sep 2017 06:42 )             34.5                                09-01    133<L>  |  95<L>  |  14  ----------------------------<  147<H>  4.3   |  25  |  0.80    Ca    8.9      01 Sep 2017 06:42        [ ] Other Labs  [ ] None ordered            Please check or [place] when present:  •  Heart Failure:    [ ] Acute        [ ]  Acute on Chronic        [ ] Chronic         [ ] Diastolic     [ ]  Combined    •  MONA:     [ ] ATN        [ ]  Renal medullary necrosis       [ ]  Renal cortical necrosis                  [ ] Other pathological Lesion:  •  CKD:  [ ] Stage I   [ ] Stage II  [ ] Stage III    [ ]Stage IV   [ ]  CKD V   [ ]  Other/Unspecified:    •  Abdominal Nutritional Status:   [ ] Malnutrition-See Nutrition note    [ ] Cachexia   [ ]  Other        [ ] Supplement ordered:            [ ] Morbid Obesity: BMI >=40         ASSESSMENT/PLAN:  37M POD #1 s/p Anterior/Posterior Fusion L5-S1    CONTINUE:          [x] Clear fluids until pt passes flatus    [x] PT    [x] DVT PPX- SCDs    [x] Pain Mgt recs appreciated    [ ] Dispo plan- For home ORTHO NOTE    [x] Pt seen/examined.  [ ] Pt without any complaints/in NAD.    [x] Pt complains of: Pain overnight, reports abdominal "stabbing pain" which has moderately resolved - pt states Valium provided relief; pt seen by Pain Management team this morning and will be making adjustments to regimen      ROS: [ ] Fever  [ ] Chills  [ ] CP [ ] SOB [ ] Dysnea  [ ] Palpitations [ ] Cough [ ] N/V/C/D [ ] Paresthia [ ] Other     [x] ROS  otherwise negative    .    PHYSICAL EXAM:    Vital Signs Last 24 Hrs  T(C): 36.3 (01 Sep 2017 09:02), Max: 37.1 (31 Aug 2017 21:00)  T(F): 97.4 (01 Sep 2017 09:02), Max: 98.7 (31 Aug 2017 21:00)  HR: 76 (01 Sep 2017 09:02) (76 - 98)  BP: 126/76 (01 Sep 2017 09:02) (116/74 - 154/83)  BP(mean): 100 (31 Aug 2017 19:45) (100 - 114)  RR: 16 (01 Sep 2017 09:02) (12 - 18)  SpO2: 99% (01 Sep 2017 09:02) (10% - 100%)    I&O's Detail    31 Aug 2017 07:01  -  01 Sep 2017 07:00  --------------------------------------------------------  IN:    IV PiggyBack: 50 mL    lactated ringers.: 760 mL  Total IN: 810 mL    OUT:    Drain: 80 mL    Drain: 80 mL    Drain: 140 mL    Indwelling Catheter - Urethral: 1825 mL  Total OUT: 2125 mL    Total NET: -1315 mL           CAPILLARY BLOOD GLUCOSE       Physical Exam:   Pt laying in bed comfortably; Abdomen soft with minimal tenderness to palpation  Surgical dressings clean/dry/in tact   EHL/FHL/TA/GS 5/5 motor strength bilateral lower extremities  SLT in tact and equal to distal bilateral lower extremities  DP pulses 2+ bilaterally     LABS                        12.5   17.9  )-----------( 204      ( 01 Sep 2017 06:42 )             34.5                                09-01    133<L>  |  95<L>  |  14  ----------------------------<  147<H>  4.3   |  25  |  0.80    Ca    8.9      01 Sep 2017 06:42        [ ] Other Labs  [ ] None ordered            Please check or [place] when present:  •  Heart Failure:    [ ] Acute        [ ]  Acute on Chronic        [ ] Chronic         [ ] Diastolic     [ ]  Combined    •  MONA:     [ ] ATN        [ ]  Renal medullary necrosis       [ ]  Renal cortical necrosis                  [ ] Other pathological Lesion:  •  CKD:  [ ] Stage I   [ ] Stage II  [ ] Stage III    [ ]Stage IV   [ ]  CKD V   [ ]  Other/Unspecified:    •  Abdominal Nutritional Status:   [ ] Malnutrition-See Nutrition note    [ ] Cachexia   [ ]  Other        [ ] Supplement ordered:            [ ] Morbid Obesity: BMI >=40         ASSESSMENT/PLAN:  37M POD #1 s/p Anterior/Posterior Fusion L5-S1    CONTINUE:          [x] Clear fluids until pt passes flatus    [x] PT    [x] DVT PPX- SCDs    [x] Pain Mgt recs appreciated    [x] Will continue to monitor drains    [ ] Dispo plan- For home

## 2017-09-01 NOTE — DISCHARGE NOTE ADULT - PLAN OF CARE
Improvement in pain and ambulation after surgery No strenuous activity (bending/twisting), heavy lifting, driving or returning to work until cleared by MD.  You may shower. Remove dressing daily before shower, pat the area dry gently then reapply dry gauze dressing x 5 days, then leave incision open to air.   Try to have regular bowel movements, take stool softener or laxative if necessary.  May take pepcid or zantac for upset stomach.  Ice affected areas to decrease swelling.  Call to schedule an appt with Dr. Leonard for follow up, if you have staples or sutures they will be removed in office.  Contact your doctor if you experience: fever greater than 101.5, chills, chest pain, difficulty breathing, redness or excessive drainage around the incision, other concerns.

## 2017-09-01 NOTE — PHYSICAL THERAPY INITIAL EVALUATION ADULT - CRITERIA FOR SKILLED THERAPEUTIC INTERVENTIONS
anticipated equipment needs at discharge/impairments found/therapy frequency/anticipated discharge recommendation/rehab potential

## 2017-09-01 NOTE — PROVIDER CONTACT NOTE (OTHER) - ASSESSMENT
Pain level still 10/10 in back and abdomen even after receiving morphine 4mg IV and dilaudid 0.5mg pca bolus. Pt asked if he can get another type of muscle relaxant since valium gave him partial pain relief.

## 2017-09-01 NOTE — PROGRESS NOTE ADULT - SUBJECTIVE AND OBJECTIVE BOX
38 yo M with a history of ITP  POD 1 L5/s1 posterior spinal fusion    Vital Signs Last 24 Hrs  T(C): 37.1 (01 Sep 2017 14:19), Max: 37.1 (31 Aug 2017 21:00)  T(F): 98.8 (01 Sep 2017 14:19), Max: 98.8 (01 Sep 2017 14:19)  HR: 115 (01 Sep 2017 14:19) (76 - 115)  BP: 121/78 (01 Sep 2017 14:19) (116/74 - 154/83)  BP(mean): 100 (31 Aug 2017 19:45) (100 - 114)  RR: 17 (01 Sep 2017 14:19) (12 - 18)  SpO2: 94% (01 Sep 2017 14:19) (10% - 100%)    Out of bed in chair, NAD  no purpura  no petechiae    PAST MEDICAL & SURGICAL HISTORY:  Purpura, idiopathic thrombocytopenia  Lumbosacral stenosis  History of microdiscectomy  History of cholecystectomy      MEDICATIONS  (STANDING):  acetaminophen   Tablet 975 milliGRAM(s) Oral every 8 hours  lactated ringers. 1000 milliLiter(s) (80 mL/Hr) IV Continuous <Continuous>  morphine ER Tablet 15 milliGRAM(s) Oral every 12 hours  simethicone 80 milliGRAM(s) Chew every 8 hours    MEDICATIONS  (PRN):  naloxone Injectable 0.1 milliGRAM(s) IV Push every 3 minutes PRN For ANY of the following changes in patient status:  A. RR LESS THAN 10 breaths per minute, B. Oxygen saturation LESS THAN 90%, C. Sedation score of 6  ondansetron Injectable 4 milliGRAM(s) IV Push every 6 hours PRN Nausea  diazepam    Tablet 5 milliGRAM(s) Oral every 8 hours PRN muscle spasm  metoclopramide Injectable 5 milliGRAM(s) IV Push every 6 hours PRN nausea and or vomiting  oxyCODONE    IR 5 milliGRAM(s) Oral every 4 hours PRN Moderate Pain (4 - 6)  oxyCODONE    IR 10 milliGRAM(s) Oral every 4 hours PRN Severe Pain (7 - 10)  HYDROmorphone  Injectable 1 milliGRAM(s) IV Push every 1 hour PRN breakthrough pain      CBC Full  -  ( 01 Sep 2017 06:42 )  WBC Count : 17.9 K/uL  Hemoglobin : 12.5 g/dL  Hematocrit : 34.5 %  Platelet Count - Automated : 204 K/uL  Mean Cell Volume : 80.4 fL  Mean Cell Hemoglobin : 29.1 pg  Mean Cell Hemoglobin Concentration : 36.2 g/dL  Auto Neutrophil # : x  Auto Lymphocyte # : x  Auto Monocyte # : x  Auto Eosinophil # : x  Auto Basophil # : x  Auto Neutrophil % : 86.1 %  Auto Lymphocyte % : 4.9 %  Auto Monocyte % : 9.0 %  Auto Eosinophil % : 0.0 %  Auto Basophil % : 0.0 %      09-01    133<L>  |  95<L>  |  14  ----------------------------<  147<H>  4.3   |  25  |  0.80    Ca    8.9      01 Sep 2017 06:42

## 2017-09-01 NOTE — PROVIDER CONTACT NOTE (OTHER) - ASSESSMENT
Pt didn't receive relief after taking flexeril 10mg po. Pt asked if he can get another pca dilaudid bolus. Dilaudid bolus is not due yet. Vital signs stable. Pt didn't receive relief after taking flexeril 10mg po. Pt asked if he can get another pca dilaudid bolus. Dilaudid bolus is not due yet. Vital signs stable. Pt also asked if pain could be gas related.

## 2017-09-01 NOTE — PROGRESS NOTE ADULT - ASSESSMENT
37M s/p spinal instrumentation and PRS closure. POD 1.  >> No collections  >> Plan to change dressing on POD 3  >> Continue drains  >> Care as per Ortho Spine Team

## 2017-09-01 NOTE — PROGRESS NOTE ADULT - SUBJECTIVE AND OBJECTIVE BOX
SUBJECTIVE:  No overnight events.     OBJECTIVE:     ** VITAL SIGNS / I&O's **    T(C): 36.6 (09-01-17 @ 04:25), Max: 37.1 (08-31-17 @ 21:00)  T(F): 97.9 (09-01-17 @ 04:25), Max: 98.7 (08-31-17 @ 21:00)  HR: 77 (09-01-17 @ 07:09) (77 - 98)  BP: 116/74 (09-01-17 @ 07:09) (116/74 - 154/83)  RR: 16 (09-01-17 @ 07:09) (12 - 18)  SpO2: 97% (09-01-17 @ 07:09) (10% - 100%)      31 Aug 2017 07:01  -  01 Sep 2017 07:00  --------------------------------------------------------  IN:    IV PiggyBack: 50 mL    lactated ringers.: 760 mL  Total IN: 810 mL    OUT:    Drain: 80 mL    Drain: 80 mL    Drain: 140 mL    Indwelling Catheter - Urethral: 1825 mL  Total OUT: 2125 mL    Total NET: -1315 mL          ** PHYSICAL EXAM **    -- CONSTITUTIONAL: AOx3. NAD.   -- CARDIOVASCULAR: Regular rate and rhythm. S1, S2.  -- RESPIRATORY: Bilateral breath sounds.   -- BACK: Dressing clean. No collections. Drains serosanguinous.  -- NEUROLOGICAL: SILT in BLE. TA/TP, FHL/EHL, FDL/EDL intact bilaterally.    ** LABS **                          12.5   17.9  )-----------( 204      ( 01 Sep 2017 06:42 )             34.5     01 Sep 2017 06:42    133    |  95     |  14     ----------------------------<  147    4.3     |  25     |  0.80     Ca    8.9        01 Sep 2017 06:42        CAPILLARY BLOOD GLUCOSE

## 2017-09-01 NOTE — PROGRESS NOTE ADULT - SUBJECTIVE AND OBJECTIVE BOX
Pt. seen at 0808  Pain Management Progress Note - [facility] [phone]    HPI:  Pt. complains of severe pain in the abdomen and back.  States PCA is not helping.  Tolerating clears.            Pertinent PMH: Pain at: _x__Back ___Neck___Knee ___Hip ___Shoulder ___ Opioid tolerance    Pain is _x__ sharp ____dull ___burning ___achy ___ Intensity: ____ mild ____mod ____severe     Location __x___surgical site _____cervical ____x_lumbar __x__abd _____upper ext____lower ext    Worse with ___x_activity __x__movement _____physical therapy___ Rest    Improved with __x__medication __x__rest ____physical therapy      HYDROmorphone  Injectable  HYDROmorphone PCA (1 mG/mL)  HYDROmorphone PCA (1 mG/mL) Rescue [facility] Bolus  naloxone Injectable  ondansetron Injectable  acetaminophen   Tablet  lactated ringers.  ceFAZolin   IVPB  morphine  - Injectable  morphine  - Injectable  diazepam    Tablet  metoclopramide  metoclopramide Injectable  morphine  - Injectable  cyclobenzaprine  HYDROmorphone  Injectable  morphine ER Tablet  oxyCODONE    IR  oxyCODONE    IR  HYDROmorphone  Injectable      ROS: Const:  ___febrile   Eyes:___ENT:___CV: ___chest pain  Resp: ____sob  GI:_-__nausea _-__vomiting __+__abd pain ___npo _+__clears ___full diet __bm  :___ Musk: _+__pain ___spasm  Skin:___ Neuro:  __-_sfinabkm__-_lbhnpysvg__-__ numbness _-__weakness ___paresthesia  Psych:___anxiety  Endo:___ Heme:___Allergy:___      09-01 @ 06:18762 mL/min/1.73M2      08-31 @ 18:79778 mL/min/1.73M2          Hemoglobin: 12.5 g/dL (09-01 @ 06:42)  Hemoglobin: 14.4 g/dL (08-31 @ 18:43)        T(C): 36.3 (09-01-17 @ 09:02), Max: 37.1 (08-31-17 @ 21:00)  HR: 76 (09-01-17 @ 09:02) (76 - 98)  BP: 138/83 (09-01-17 @ 11:55) (116/74 - 154/83)  RR: 16 (09-01-17 @ 11:55) (12 - 18)  SpO2: 82% (09-01-17 @ 11:55) (10% - 100%)  Wt(kg): --     PHYSICAL EXAM:  Gen Appearance: _x__no acute distress __x_appropriate         Neuro: _x__SILT feet__x__ EOM Intact Psych: AAOX3__, x___mood/affect appropriate        Eyes: _x__conjunctiva WNL  __x___ Pupils equal and round        ENT: _x__ears and nose atraumatic_x__ Hearing grossly intact        Neck: ___trachea midline, no visible masses ___thyroid without palpable mass    Resp: _x__Nml WOB____No tactile fremitus ___clear to auscultation    Cardio: ___extremities free from edema ____pedal pulses palpable    GI/Abdomen: ___soft _____ Nontender______Nondistended_____HSM    Lymphatic: ___no palpable nodes in neck  ___no palpable nodes calves and feet    Skin/Wound: ___Incision, ___Dressing c/d/i,   ____surrounding tissues soft,  ___drain/chest tube present____    Muscular: EHL __5_/5  Gastrocnemius___/5    __x_absent clubbing/cyanosis         ASSESSMENT:  This is a 37y old Male with a history of:  LUMBO SACRAL STENOSIS M48.07  Purpura, idiopathic thrombocytopenia  Lumbosacral stenosis  Anxiety  Anticipatory anxiety  Sciatica, unspecified laterality  History of microdiscectomy  History of cholecystectomy  and back pain S/P ASF and PSF L5-S1 and pain is not well controlled on dilaudid PCA        Recommended Treatment PLAN:  1.  D/C PCA  2.  MS contin 15 mg po q12h  3.  Oxycodone 5-10 mg po q4h prn  4.  Dilaudid 1 mg IV q1h prn  5.  Tylenol 975 mg po q8h Pt. seen at 0808  Pain Management Progress Note - [facility] [phone]    HPI:  Pt. complains of severe pain in the abdomen and back.  States PCA is not helping.  Tolerating clears.            Pertinent PMH: Pain at: _x__Back ___Neck___Knee ___Hip ___Shoulder ___ Opioid tolerance    Pain is _x__ sharp ____dull ___burning ___achy ___ Intensity: ____ mild ____mod ____severe     Location __x___surgical site _____cervical ____x_lumbar __x__abd _____upper ext____lower ext    Worse with ___x_activity __x__movement _____physical therapy___ Rest    Improved with __x__medication __x__rest ____physical therapy      HYDROmorphone  Injectable  HYDROmorphone PCA (1 mG/mL)  HYDROmorphone PCA (1 mG/mL) Rescue [facility] Bolus  naloxone Injectable  ondansetron Injectable  acetaminophen   Tablet  lactated ringers.  ceFAZolin   IVPB  morphine  - Injectable  morphine  - Injectable  diazepam    Tablet  metoclopramide  metoclopramide Injectable  morphine  - Injectable  cyclobenzaprine  HYDROmorphone  Injectable  morphine ER Tablet  oxyCODONE    IR  oxyCODONE    IR  HYDROmorphone  Injectable      ROS: Const:  ___febrile   Eyes:___ENT:___CV: ___chest pain  Resp: ____sob  GI:_-__nausea _-__vomiting __+__abd pain ___npo _+__clears ___full diet __bm  :___ Musk: _+__pain ___spasm  Skin:___ Neuro:  __-_yhyuuvxt__-_ekrpbgecm__-__ numbness _-__weakness ___paresthesia  Psych:___anxiety  Endo:___ Heme:___Allergy:___      09-01 @ 06:34861 mL/min/1.73M2      08-31 @ 18:51371 mL/min/1.73M2          Hemoglobin: 12.5 g/dL (09-01 @ 06:42)  Hemoglobin: 14.4 g/dL (08-31 @ 18:43)        T(C): 36.3 (09-01-17 @ 09:02), Max: 37.1 (08-31-17 @ 21:00)  HR: 76 (09-01-17 @ 09:02) (76 - 98)  BP: 138/83 (09-01-17 @ 11:55) (116/74 - 154/83)  RR: 16 (09-01-17 @ 11:55) (12 - 18)  SpO2: 82% (09-01-17 @ 11:55) (10% - 100%)  Wt(kg): --     PHYSICAL EXAM:  Gen Appearance: _x__no acute distress __x_appropriate         Neuro: _x__SILT feet__x__ EOM Intact Psych: AAOX3__, x___mood/affect appropriate        Eyes: _x__conjunctiva WNL  __x___ Pupils equal and round        ENT: _x__ears and nose atraumatic_x__ Hearing grossly intact        Neck: ___trachea midline, no visible masses ___thyroid without palpable mass    Resp: _x__Nml WOB____No tactile fremitus ___clear to auscultation    Cardio: ___extremities free from edema ____pedal pulses palpable    GI/Abdomen: ___soft _____ Nontender______Nondistended_____HSM    Lymphatic: ___no palpable nodes in neck  ___no palpable nodes calves and feet    Skin/Wound: ___Incision, ___Dressing c/d/i,   ____surrounding tissues soft,  ___drain/chest tube present____    Muscular: EHL __5_/5  Gastrocnemius___/5    __x_absent clubbing/cyanosis         ASSESSMENT:  This is a 37y old Male with a history of:  LUMBO SACRAL STENOSIS M48.07  Purpura, idiopathic thrombocytopenia  Lumbosacral stenosis  Anxiety  Anticipatory anxiety  Sciatica, unspecified laterality  History of microdiscectomy  History of cholecystectomy  and back pain S/P ASF and PSF L5-S1 and pain is not well controlled on dilaudid PCA        Recommended Treatment PLAN:  1.  D/C PCA  2.  MS contin 15 mg po q12h  3.  Oxycodone 5-10 mg po q4h prn  4.  Dilaudid 1 mg IV q1h prn  5.  Tylenol 975 mg po q8h    Addendum:  Re-assessed patient at 1434 and pt. seen sitting up in a chair and states he is feeling much better and pain is better controlled now.  Reviewed pain management regimen with patient.

## 2017-09-01 NOTE — DISCHARGE NOTE ADULT - PATIENT PORTAL LINK FT
“You can access the [employer] Patient Portal, offered by [facility], by registering with the following website: [email or web address]”

## 2017-09-02 DIAGNOSIS — E87.1 HYPO-OSMOLALITY AND HYPONATREMIA: ICD-10-CM

## 2017-09-02 DIAGNOSIS — D50.0 IRON DEFICIENCY ANEMIA SECONDARY TO BLOOD LOSS (CHRONIC): ICD-10-CM

## 2017-09-02 LAB
ANION GAP SERPL CALC-SCNC: 10 MMOL/L — SIGNIFICANT CHANGE UP (ref 5–17)
BASOPHILS NFR BLD AUTO: 0.1 % — SIGNIFICANT CHANGE UP (ref 0–2)
BUN SERPL-MCNC: 18 MG/DL — SIGNIFICANT CHANGE UP (ref 7–23)
CALCIUM SERPL-MCNC: 8.7 MG/DL — SIGNIFICANT CHANGE UP (ref 8.4–10.5)
CHLORIDE SERPL-SCNC: 92 MMOL/L — LOW (ref 96–108)
CO2 SERPL-SCNC: 27 MMOL/L — SIGNIFICANT CHANGE UP (ref 22–31)
CREAT SERPL-MCNC: 1 MG/DL — SIGNIFICANT CHANGE UP (ref 0.5–1.3)
CULTURE RESULTS: NO GROWTH — SIGNIFICANT CHANGE UP
EOSINOPHIL NFR BLD AUTO: 0.2 % — SIGNIFICANT CHANGE UP (ref 0–6)
GLUCOSE SERPL-MCNC: 128 MG/DL — HIGH (ref 70–99)
HCT VFR BLD CALC: 26.3 % — LOW (ref 39–50)
HGB BLD-MCNC: 9.7 G/DL — LOW (ref 13–17)
LYMPHOCYTES # BLD AUTO: 13.8 % — SIGNIFICANT CHANGE UP (ref 13–44)
MCHC RBC-ENTMCNC: 30.1 PG — SIGNIFICANT CHANGE UP (ref 27–34)
MCHC RBC-ENTMCNC: 36.9 G/DL — HIGH (ref 32–36)
MCV RBC AUTO: 81.7 FL — SIGNIFICANT CHANGE UP (ref 80–100)
MONOCYTES NFR BLD AUTO: 13.4 % — SIGNIFICANT CHANGE UP (ref 2–14)
NEUTROPHILS NFR BLD AUTO: 72.5 % — SIGNIFICANT CHANGE UP (ref 43–77)
PLATELET # BLD AUTO: 155 K/UL — SIGNIFICANT CHANGE UP (ref 150–400)
POTASSIUM SERPL-MCNC: 4.6 MMOL/L — SIGNIFICANT CHANGE UP (ref 3.5–5.3)
POTASSIUM SERPL-SCNC: 4.6 MMOL/L — SIGNIFICANT CHANGE UP (ref 3.5–5.3)
RBC # BLD: 3.22 M/UL — LOW (ref 4.2–5.8)
RBC # FLD: 13.1 % — SIGNIFICANT CHANGE UP (ref 10.3–16.9)
SODIUM SERPL-SCNC: 129 MMOL/L — LOW (ref 135–145)
SPECIMEN SOURCE: SIGNIFICANT CHANGE UP
TROPONIN T SERPL-MCNC: <0.01 NG/ML — SIGNIFICANT CHANGE UP (ref 0–0.01)
WBC # BLD: 13.4 K/UL — HIGH (ref 3.8–10.5)
WBC # FLD AUTO: 13.4 K/UL — HIGH (ref 3.8–10.5)

## 2017-09-02 PROCEDURE — 72110 X-RAY EXAM L-2 SPINE 4/>VWS: CPT | Mod: 26

## 2017-09-02 RX ADMIN — Medication 975 MILLIGRAM(S): at 06:33

## 2017-09-02 RX ADMIN — OXYCODONE HYDROCHLORIDE 10 MILLIGRAM(S): 5 TABLET ORAL at 11:48

## 2017-09-02 RX ADMIN — OXYCODONE HYDROCHLORIDE 10 MILLIGRAM(S): 5 TABLET ORAL at 07:32

## 2017-09-02 RX ADMIN — OXYCODONE HYDROCHLORIDE 10 MILLIGRAM(S): 5 TABLET ORAL at 01:23

## 2017-09-02 RX ADMIN — SIMETHICONE 80 MILLIGRAM(S): 80 TABLET, CHEWABLE ORAL at 06:33

## 2017-09-02 RX ADMIN — HYDROMORPHONE HYDROCHLORIDE 1 MILLIGRAM(S): 2 INJECTION INTRAMUSCULAR; INTRAVENOUS; SUBCUTANEOUS at 08:14

## 2017-09-02 RX ADMIN — Medication 5 MILLIGRAM(S): at 04:45

## 2017-09-02 RX ADMIN — OXYCODONE HYDROCHLORIDE 10 MILLIGRAM(S): 5 TABLET ORAL at 11:10

## 2017-09-02 RX ADMIN — HYDROMORPHONE HYDROCHLORIDE 1 MILLIGRAM(S): 2 INJECTION INTRAMUSCULAR; INTRAVENOUS; SUBCUTANEOUS at 02:39

## 2017-09-02 RX ADMIN — Medication 5 MILLIGRAM(S): at 18:02

## 2017-09-02 RX ADMIN — HYDROMORPHONE HYDROCHLORIDE 1 MILLIGRAM(S): 2 INJECTION INTRAMUSCULAR; INTRAVENOUS; SUBCUTANEOUS at 13:48

## 2017-09-02 RX ADMIN — MORPHINE SULFATE 15 MILLIGRAM(S): 50 CAPSULE, EXTENDED RELEASE ORAL at 08:38

## 2017-09-02 RX ADMIN — HYDROMORPHONE HYDROCHLORIDE 1 MILLIGRAM(S): 2 INJECTION INTRAMUSCULAR; INTRAVENOUS; SUBCUTANEOUS at 07:59

## 2017-09-02 RX ADMIN — OXYCODONE HYDROCHLORIDE 10 MILLIGRAM(S): 5 TABLET ORAL at 16:21

## 2017-09-02 RX ADMIN — OXYCODONE HYDROCHLORIDE 10 MILLIGRAM(S): 5 TABLET ORAL at 16:51

## 2017-09-02 RX ADMIN — SIMETHICONE 80 MILLIGRAM(S): 80 TABLET, CHEWABLE ORAL at 13:12

## 2017-09-02 RX ADMIN — MORPHINE SULFATE 15 MILLIGRAM(S): 50 CAPSULE, EXTENDED RELEASE ORAL at 21:00

## 2017-09-02 RX ADMIN — SIMETHICONE 80 MILLIGRAM(S): 80 TABLET, CHEWABLE ORAL at 21:01

## 2017-09-02 RX ADMIN — Medication 975 MILLIGRAM(S): at 13:12

## 2017-09-02 RX ADMIN — MORPHINE SULFATE 15 MILLIGRAM(S): 50 CAPSULE, EXTENDED RELEASE ORAL at 22:00

## 2017-09-02 RX ADMIN — Medication 975 MILLIGRAM(S): at 21:00

## 2017-09-02 RX ADMIN — OXYCODONE HYDROCHLORIDE 10 MILLIGRAM(S): 5 TABLET ORAL at 06:32

## 2017-09-02 RX ADMIN — HYDROMORPHONE HYDROCHLORIDE 1 MILLIGRAM(S): 2 INJECTION INTRAMUSCULAR; INTRAVENOUS; SUBCUTANEOUS at 13:13

## 2017-09-02 RX ADMIN — MORPHINE SULFATE 15 MILLIGRAM(S): 50 CAPSULE, EXTENDED RELEASE ORAL at 09:04

## 2017-09-02 RX ADMIN — SODIUM CHLORIDE 80 MILLILITER(S): 9 INJECTION, SOLUTION INTRAVENOUS at 06:34

## 2017-09-02 RX ADMIN — HYDROMORPHONE HYDROCHLORIDE 1 MILLIGRAM(S): 2 INJECTION INTRAMUSCULAR; INTRAVENOUS; SUBCUTANEOUS at 02:54

## 2017-09-02 RX ADMIN — OXYCODONE HYDROCHLORIDE 10 MILLIGRAM(S): 5 TABLET ORAL at 02:23

## 2017-09-02 NOTE — PROGRESS NOTE ADULT - SUBJECTIVE AND OBJECTIVE BOX
36 yo M with a history of ITP  POD 2 L5/s1 posterior spinal fusion  received pulse dex as outpatient    patient is off the floor at xray    ICU Vital Signs Last 24 Hrs  T(C): 36.1 (02 Sep 2017 08:34), Max: 37.3 (02 Sep 2017 04:15)  T(F): 97 (02 Sep 2017 08:34), Max: 99.1 (02 Sep 2017 04:15)  HR: 102 (02 Sep 2017 08:34) (96 - 121)  BP: 123/84 (02 Sep 2017 08:34) (95/66 - 125/70)  BP(mean): --  ABP: --  ABP(mean): --  RR: 16 (02 Sep 2017 08:34) (16 - 17)  SpO2: 97% (02 Sep 2017 08:34) (93% - 114%)        PAST MEDICAL & SURGICAL HISTORY:  Purpura, idiopathic thrombocytopenia  Lumbosacral stenosis  History of microdiscectomy  History of cholecystectomy    MEDICATIONS  (STANDING):  acetaminophen   Tablet 975 milliGRAM(s) Oral every 8 hours  lactated ringers. 1000 milliLiter(s) (80 mL/Hr) IV Continuous <Continuous>  morphine ER Tablet 15 milliGRAM(s) Oral every 12 hours  simethicone 80 milliGRAM(s) Chew every 8 hours    MEDICATIONS  (PRN):  naloxone Injectable 0.1 milliGRAM(s) IV Push every 3 minutes PRN For ANY of the following changes in patient status:  A. RR LESS THAN 10 breaths per minute, B. Oxygen saturation LESS THAN 90%, C. Sedation score of 6  ondansetron Injectable 4 milliGRAM(s) IV Push every 6 hours PRN Nausea  diazepam    Tablet 5 milliGRAM(s) Oral every 8 hours PRN muscle spasm  metoclopramide Injectable 5 milliGRAM(s) IV Push every 6 hours PRN nausea and or vomiting  oxyCODONE    IR 5 milliGRAM(s) Oral every 4 hours PRN Moderate Pain (4 - 6)  oxyCODONE    IR 10 milliGRAM(s) Oral every 4 hours PRN Severe Pain (7 - 10)  HYDROmorphone  Injectable 1 milliGRAM(s) IV Push every 1 hour PRN breakthrough pain                              9.7    13.4  )-----------( 155      ( 02 Sep 2017 06:51 )             26.3         09-02    129<L>  |  92<L>  |  18  ----------------------------<  128<H>  4.6   |  27  |  1.00    Ca    8.7      02 Sep 2017 06:51

## 2017-09-02 NOTE — PROGRESS NOTE ADULT - SUBJECTIVE AND OBJECTIVE BOX
SPINE NOTE    Patient seen and examined at bedside. He is resting comfortably. Some incisional pain. Denies leg pain, numbness or tingling. Pain is well controlled on current regimen. He complains of abdomen bloating without pain. + flatus.     Exam: Abdominal and lumbar incisions clean, dry and intact. Dressing changed. Drains in tact and ON-Q pump in place. Bilateral lower extremity strength 5/5 and sensation grossly intact. DP palpable bilaterally. No calf tenderness. Abdomen is nontender with mild distension.     A/P: POD # 2 s/p ASF and PSF L5-S1  - pain control  - follow labs  - follow drain outputs  - continue bowel regimen  - Lumbar xrays today  - Discussed with Dr Leonard.

## 2017-09-02 NOTE — PROGRESS NOTE ADULT - ASSESSMENT
37M s/p spinal instrumentation and PRS closure. POD 2.  >> No collections  >> Plan to change dressing on POD 3  >> Continue drains  >> Care as per Ortho Spine Team

## 2017-09-02 NOTE — PROGRESS NOTE ADULT - SUBJECTIVE AND OBJECTIVE BOX
SUBJECTIVE:  Doing well.   No overnight events.     OBJECTIVE:     ** VITAL SIGNS / I&O's **    T(C): 37.3 (09-02-17 @ 04:15), Max: 37.3 (09-02-17 @ 04:15)  T(F): 99.1 (09-02-17 @ 04:15), Max: 99.1 (09-02-17 @ 04:15)  HR: 101 (09-02-17 @ 04:15) (76 - 121)  BP: 110/77 (09-02-17 @ 04:15) (95/66 - 138/83)  RR: 16 (09-02-17 @ 04:15) (16 - 17)  SpO2: 97% (09-02-17 @ 04:15) (82% - 114%)      01 Sep 2017 07:01  -  02 Sep 2017 07:00  --------------------------------------------------------  IN:    lactated ringers.: 1680 mL    Oral Fluid: 800 mL  Total IN: 2480 mL    OUT:    Drain: 85 mL    Drain: 90 mL    Drain: 140 mL    Indwelling Catheter - Urethral: 900 mL  Total OUT: 1215 mL    Total NET: 1265 mL          ** PHYSICAL EXAM **    -- CONSTITUTIONAL: AOx3. NAD.   -- CARDIOVASCULAR: Regular rate and rhythm. S1, S2.  -- RESPIRATORY: Bilateral breath sounds.   -- BACK: Dressing clean. No collections. Drains serosanguinous.  -- NEUROLOGICAL: SILT in BLE. TA/TP, FHL/EHL, FDL/EDL intact bilaterally.    ** LABS **                          9.7    13.4  )-----------( 155      ( 02 Sep 2017 06:51 )             26.3     02 Sep 2017 06:51    129    |  92     |  18     ----------------------------<  128    4.6     |  27     |  1.00     Ca    8.7        02 Sep 2017 06:51        CAPILLARY BLOOD GLUCOSE

## 2017-09-02 NOTE — PROGRESS NOTE ADULT - SUBJECTIVE AND OBJECTIVE BOX
STATUS POST:   L5-S1 posterior spinal fusion with anterior exposure POD2     SUBJECTIVE: Patient seen and examined bedside. Pain well controlled. Attempted CLD yest but only able to tolerate a small amount. Passing flatus. Denies BM, N, V, cp, sob, fever/chills.         Vital Signs Last 24 Hrs  T(C): 36.1 (02 Sep 2017 08:34), Max: 37.3 (02 Sep 2017 04:15)  T(F): 97 (02 Sep 2017 08:34), Max: 99.1 (02 Sep 2017 04:15)  HR: 102 (02 Sep 2017 08:34) (96 - 121)  BP: 123/84 (02 Sep 2017 08:34) (95/66 - 138/83)  BP(mean): --  RR: 16 (02 Sep 2017 08:34) (16 - 17)  SpO2: 97% (02 Sep 2017 08:34) (82% - 114%)  I&O's Detail    01 Sep 2017 07:01  -  02 Sep 2017 07:00  --------------------------------------------------------  IN:    lactated ringers.: 1680 mL    Oral Fluid: 800 mL  Total IN: 2480 mL    OUT:    Drain: 85 mL    Drain: 90 mL    Drain: 140 mL    Indwelling Catheter - Urethral: 2400 mL  Total OUT: 2715 mL    Total NET: -235 mL          General: NAD, resting comfortably in bed  Pulm: Nonlabored breathing, no respiratory distress on RA  Abd: soft, ND, non tender to palpation. Dressing in place, CDI  Extrem: WWP, no edema        LABS:                        9.7    13.4  )-----------( 155      ( 02 Sep 2017 06:51 )             26.3     09-02    129<L>  |  92<L>  |  18  ----------------------------<  128<H>  4.6   |  27  |  1.00    Ca    8.7      02 Sep 2017 06:51            RADIOLOGY & ADDITIONAL STUDIES:

## 2017-09-02 NOTE — PROGRESS NOTE ADULT - ASSESSMENT
37yoM with lumbosacral stenosis s/p posterior L5-S1 fusion with anterior exposure POD2    Now passing flatus. Recommend CLD for now, reaccess tomorrow.   Aim to improve pain control, though with minimal narcotics if prolonged lack of BF  Appreciate care per primary team

## 2017-09-02 NOTE — PROGRESS NOTE ADULT - SUBJECTIVE AND OBJECTIVE BOX
Patient seen and examined at bedside.     SUBJECTIVE: No acute events overnight.  Pain tolerable.    Denies Chest Pain/SOB.    Denies Headache.    Denies Nausea/vomiting.      Vital Signs Last 24 Hrs  T(C): 37.3 (02 Sep 2017 04:15), Max: 37.3 (02 Sep 2017 04:15)  T(F): 99.1 (02 Sep 2017 04:15), Max: 99.1 (02 Sep 2017 04:15)  HR: 101 (02 Sep 2017 04:15) (76 - 121)  BP: 110/77 (02 Sep 2017 04:15) (95/66 - 138/83)  BP(mean): --  RR: 16 (02 Sep 2017 04:15) (16 - 17)  SpO2: 97% (02 Sep 2017 04:15) (82% - 114%)    NAD, non labored respirations  Affected extremity:          Dressing: clean/dry/intact          Drains: none         Sensation: [x ] intact to light touch  [ ] decreased            Pain                  Vascular: [x ] warm well perfused; capillary refill <3 seconds          Motor exam: [x ]         [ ] Upper extremity                   Nae (c5)   Bi(c5/6)   WE(c6)   EE(c7)    (c8)                                                R           5              5            5             5             5                                               L            5              5            5             5            5         [x ] Lower extremity                   IP(L2)     Q(L3)     TA(L4)     EHL(L5)     GS(s1)                                                 R          5           5          5            5              5                                               L           5           5         5             5              5                  Passed flatus, per patient.     A/P :  37y Male s/p Ant Post L5-S1 PSF 8/31/17     -    Pain control + bowel regimen  -    DVT ppx: SCDs    -    Periop abx    -    ADAT  -    Check AM labs  -    Weight bearing status:   WBAT        -    Physical Therapy  -    Resume home meds  -    Dispo planning

## 2017-09-02 NOTE — PROGRESS NOTE ADULT - ASSESSMENT
37M with a history of ITP s/p L5-S1 posterior spinal fusion POD 2      s/p dexamethasone as outpatient with complete response.   currently with normal platelet count  continue to monitor during hospitalization  daily CBC    please call with questions    Thank you    Madhuri Donald MD for Dr. Torie Cerna    [phone]

## 2017-09-02 NOTE — PROGRESS NOTE ADULT - SUBJECTIVE AND OBJECTIVE BOX
HPI:  37 year old male  with low back pain x years with sciatica.  MRI confirmed diagnosis lumbar spinal stenosis.  Patient ambulates with difficulty and symptoms worse after prolonged imobility and in car.  Patient day #2 post op anterior posterior spinal fusion with instrumentation.  Patient with nausea and severe pain post op including stabbing abdominal pain seen in pain management by Dr. Goodwin.     PAST MEDICAL & SURGICAL HISTORY:  Purpura, idiopathic thrombocytopenia  Lumbosacral stenosis  History of microdiscectomy  History of cholecystectomy      MEDICATIONS  (STANDING):  acetaminophen   Tablet 975 milliGRAM(s) Oral every 8 hours  lactated ringers. 1000 milliLiter(s) (80 mL/Hr) IV Continuous <Continuous>  morphine ER Tablet 15 milliGRAM(s) Oral every 12 hours  simethicone 80 milliGRAM(s) Chew every 8 hours    MEDICATIONS  (PRN):  naloxone Injectable 0.1 milliGRAM(s) IV Push every 3 minutes PRN For ANY of the following changes in patient status:  A. RR LESS THAN 10 breaths per minute, B. Oxygen saturation LESS THAN 90%, C. Sedation score of 6  ondansetron Injectable 4 milliGRAM(s) IV Push every 6 hours PRN Nausea  diazepam    Tablet 5 milliGRAM(s) Oral every 8 hours PRN muscle spasm  metoclopramide Injectable 5 milliGRAM(s) IV Push every 6 hours PRN nausea and or vomiting  oxyCODONE    IR 5 milliGRAM(s) Oral every 4 hours PRN Moderate Pain (4 - 6)  oxyCODONE    IR 10 milliGRAM(s) Oral every 4 hours PRN Severe Pain (7 - 10)  HYDROmorphone  Injectable 1 milliGRAM(s) IV Push every 1 hour PRN breakthrough pain      Allergies    No Known Allergies    REVIEW OF SYSTEMS    General:  malaise	  Skin/Breast: normal  Ophthalmologic: negative  ENMT:	normal  Respiratory and Thorax: normal  Cardiovascular:	normal  Gastrointestinal:	normal  Genitourinary:	normal  Musculoskeletal: normal	   Neurological:	low back pain  Psychiatric:	normal  Hematology/Lymphatics:	negative  Endocrine:	negative  Allergic/Immunologic:	negative      PHYSICAL EXAM:    Vital Signs Last 24 Hrs  T(C): 37.3 (02 Sep 2017 04:15), Max: 37.3 (02 Sep 2017 04:15)  T(F): 99.1 (02 Sep 2017 04:15), Max: 99.1 (02 Sep 2017 04:15)  HR: 101 (02 Sep 2017 04:15) (76 - 121)  BP: 110/77 (02 Sep 2017 04:15) (95/66 - 138/83)  BP(mean): --  RR: 16 (02 Sep 2017 04:15) (16 - 17)  SpO2: 97% (02 Sep 2017 04:15) (82% - 114%)    Constitutional: WDWNM  Eyes: conj pale  ENMT: negative  Neck: supple  Breasts: not examined   Back: negative  Respiratory: clear to P&A  Cardiovascular: no MRGT or H  Gastrointestinal: normal bowel sounds  Genitourinary: neg  Rectal: not examined  Extremities: normal  Vascular: normal  Neurological: + SLR  Skin: negative  Lymph Nodes: negative  Musculoskeletal:   normal  Psychiatric: anxiety                          12.5   17.9  )-----------( 204      ( 01 Sep 2017 06:42 )             34.5       09-01    133<L>  |  95<L>  |  14  ----------------------------<  147<H>  4.3   |  25  |  0.80    Ca    8.9      01 Sep 2017 06:42

## 2017-09-03 LAB
ANION GAP SERPL CALC-SCNC: 5 MMOL/L — SIGNIFICANT CHANGE UP (ref 5–17)
BASOPHILS NFR BLD AUTO: 0.1 % — SIGNIFICANT CHANGE UP (ref 0–2)
BUN SERPL-MCNC: 11 MG/DL — SIGNIFICANT CHANGE UP (ref 7–23)
CALCIUM SERPL-MCNC: 8.1 MG/DL — LOW (ref 8.4–10.5)
CHLORIDE SERPL-SCNC: 93 MMOL/L — LOW (ref 96–108)
CO2 SERPL-SCNC: 30 MMOL/L — SIGNIFICANT CHANGE UP (ref 22–31)
CREAT SERPL-MCNC: 0.8 MG/DL — SIGNIFICANT CHANGE UP (ref 0.5–1.3)
EOSINOPHIL NFR BLD AUTO: 0.4 % — SIGNIFICANT CHANGE UP (ref 0–6)
GLUCOSE SERPL-MCNC: 115 MG/DL — HIGH (ref 70–99)
HCT VFR BLD CALC: 20.8 % — CRITICAL LOW (ref 39–50)
HCT VFR BLD CALC: 23.1 % — LOW (ref 39–50)
HGB BLD-MCNC: 7.2 G/DL — LOW (ref 13–17)
HGB BLD-MCNC: 8 G/DL — LOW (ref 13–17)
LYMPHOCYTES # BLD AUTO: 12.2 % — LOW (ref 13–44)
MCHC RBC-ENTMCNC: 29.6 PG — SIGNIFICANT CHANGE UP (ref 27–34)
MCHC RBC-ENTMCNC: 29.6 PG — SIGNIFICANT CHANGE UP (ref 27–34)
MCHC RBC-ENTMCNC: 34.6 G/DL — SIGNIFICANT CHANGE UP (ref 32–36)
MCHC RBC-ENTMCNC: 34.6 G/DL — SIGNIFICANT CHANGE UP (ref 32–36)
MCV RBC AUTO: 85.6 FL — SIGNIFICANT CHANGE UP (ref 80–100)
MCV RBC AUTO: 85.6 FL — SIGNIFICANT CHANGE UP (ref 80–100)
MONOCYTES NFR BLD AUTO: 14.9 % — HIGH (ref 2–14)
NEUTROPHILS NFR BLD AUTO: 72.4 % — SIGNIFICANT CHANGE UP (ref 43–77)
PLATELET # BLD AUTO: 111 K/UL — LOW (ref 150–400)
PLATELET # BLD AUTO: 139 K/UL — LOW (ref 150–400)
POTASSIUM SERPL-MCNC: 4 MMOL/L — SIGNIFICANT CHANGE UP (ref 3.5–5.3)
POTASSIUM SERPL-SCNC: 4 MMOL/L — SIGNIFICANT CHANGE UP (ref 3.5–5.3)
RBC # BLD: 2.43 M/UL — LOW (ref 4.2–5.8)
RBC # BLD: 2.7 M/UL — LOW (ref 4.2–5.8)
RBC # FLD: 12.5 % — SIGNIFICANT CHANGE UP (ref 10.3–16.9)
RBC # FLD: 12.8 % — SIGNIFICANT CHANGE UP (ref 10.3–16.9)
SODIUM SERPL-SCNC: 128 MMOL/L — LOW (ref 135–145)
WBC # BLD: 10.9 K/UL — HIGH (ref 3.8–10.5)
WBC # BLD: 15.4 K/UL — HIGH (ref 3.8–10.5)
WBC # FLD AUTO: 10.9 K/UL — HIGH (ref 3.8–10.5)
WBC # FLD AUTO: 15.4 K/UL — HIGH (ref 3.8–10.5)

## 2017-09-03 PROCEDURE — 74176 CT ABD & PELVIS W/O CONTRAST: CPT | Mod: 26

## 2017-09-03 PROCEDURE — 99222 1ST HOSP IP/OBS MODERATE 55: CPT

## 2017-09-03 RX ORDER — DOCUSATE SODIUM 100 MG
100 CAPSULE ORAL
Qty: 0 | Refills: 0 | Status: DISCONTINUED | OUTPATIENT
Start: 2017-09-03 | End: 2017-09-04

## 2017-09-03 RX ORDER — FERROUS SULFATE 325(65) MG
325 TABLET ORAL DAILY
Qty: 0 | Refills: 0 | Status: DISCONTINUED | OUTPATIENT
Start: 2017-09-03 | End: 2017-09-04

## 2017-09-03 RX ADMIN — MORPHINE SULFATE 15 MILLIGRAM(S): 50 CAPSULE, EXTENDED RELEASE ORAL at 10:10

## 2017-09-03 RX ADMIN — MORPHINE SULFATE 15 MILLIGRAM(S): 50 CAPSULE, EXTENDED RELEASE ORAL at 10:40

## 2017-09-03 RX ADMIN — OXYCODONE HYDROCHLORIDE 10 MILLIGRAM(S): 5 TABLET ORAL at 12:25

## 2017-09-03 RX ADMIN — OXYCODONE HYDROCHLORIDE 10 MILLIGRAM(S): 5 TABLET ORAL at 11:58

## 2017-09-03 RX ADMIN — OXYCODONE HYDROCHLORIDE 10 MILLIGRAM(S): 5 TABLET ORAL at 00:13

## 2017-09-03 RX ADMIN — OXYCODONE HYDROCHLORIDE 10 MILLIGRAM(S): 5 TABLET ORAL at 01:13

## 2017-09-03 RX ADMIN — Medication 975 MILLIGRAM(S): at 14:51

## 2017-09-03 RX ADMIN — OXYCODONE HYDROCHLORIDE 10 MILLIGRAM(S): 5 TABLET ORAL at 05:08

## 2017-09-03 RX ADMIN — Medication 975 MILLIGRAM(S): at 05:08

## 2017-09-03 RX ADMIN — OXYCODONE HYDROCHLORIDE 5 MILLIGRAM(S): 5 TABLET ORAL at 16:11

## 2017-09-03 RX ADMIN — OXYCODONE HYDROCHLORIDE 10 MILLIGRAM(S): 5 TABLET ORAL at 06:08

## 2017-09-03 RX ADMIN — OXYCODONE HYDROCHLORIDE 10 MILLIGRAM(S): 5 TABLET ORAL at 17:17

## 2017-09-03 RX ADMIN — OXYCODONE HYDROCHLORIDE 10 MILLIGRAM(S): 5 TABLET ORAL at 21:24

## 2017-09-03 RX ADMIN — Medication 5 MILLIGRAM(S): at 07:35

## 2017-09-03 RX ADMIN — SIMETHICONE 80 MILLIGRAM(S): 80 TABLET, CHEWABLE ORAL at 14:51

## 2017-09-03 RX ADMIN — Medication 975 MILLIGRAM(S): at 21:24

## 2017-09-03 RX ADMIN — Medication 100 MILLIGRAM(S): at 16:11

## 2017-09-03 RX ADMIN — SIMETHICONE 80 MILLIGRAM(S): 80 TABLET, CHEWABLE ORAL at 05:08

## 2017-09-03 RX ADMIN — Medication 325 MILLIGRAM(S): at 10:12

## 2017-09-03 RX ADMIN — OXYCODONE HYDROCHLORIDE 5 MILLIGRAM(S): 5 TABLET ORAL at 16:45

## 2017-09-03 RX ADMIN — SIMETHICONE 80 MILLIGRAM(S): 80 TABLET, CHEWABLE ORAL at 21:23

## 2017-09-03 RX ADMIN — OXYCODONE HYDROCHLORIDE 10 MILLIGRAM(S): 5 TABLET ORAL at 22:24

## 2017-09-03 NOTE — PROGRESS NOTE ADULT - SUBJECTIVE AND OBJECTIVE BOX
SUBJECTIVE: patient resting comfortably in bed without any complains  Flatus: [x ] YES [ ] NO             Bowel Movement: [ ] YES [x ] NO  Pain (0-10):            Pain Control Adequate: [x ] YES [ ] NO  Nausea: [ ] YES [ x] NO            Vomiting: [ ] YES [ x] NO  Diarrhea: [ ] YES [x ] NO         Constipation: [ ] YES [x ] NO     Chest Pain: [ ] YES [x ] NO    SOB:  [ ] YES [x ] NO    MEDICATIONS  (STANDING):  acetaminophen   Tablet 975 milliGRAM(s) Oral every 8 hours  morphine ER Tablet 15 milliGRAM(s) Oral every 12 hours  simethicone 80 milliGRAM(s) Chew every 8 hours  ferrous    sulfate 325 milliGRAM(s) Oral daily  docusate sodium 100 milliGRAM(s) Oral two times a day    MEDICATIONS  (PRN):  naloxone Injectable 0.1 milliGRAM(s) IV Push every 3 minutes PRN For ANY of the following changes in patient status:  A. RR LESS THAN 10 breaths per minute, B. Oxygen saturation LESS THAN 90%, C. Sedation score of 6  ondansetron Injectable 4 milliGRAM(s) IV Push every 6 hours PRN Nausea  diazepam    Tablet 5 milliGRAM(s) Oral every 8 hours PRN muscle spasm  metoclopramide Injectable 5 milliGRAM(s) IV Push every 6 hours PRN nausea and or vomiting  oxyCODONE    IR 5 milliGRAM(s) Oral every 4 hours PRN Moderate Pain (4 - 6)  oxyCODONE    IR 10 milliGRAM(s) Oral every 4 hours PRN Severe Pain (7 - 10)  HYDROmorphone  Injectable 1 milliGRAM(s) IV Push every 1 hour PRN breakthrough pain  bisacodyl 5 milliGRAM(s) Oral every 12 hours PRN Constipation      Vital Signs Last 24 Hrs  T(C): 36.7 (03 Sep 2017 14:34), Max: 37.6 (02 Sep 2017 20:59)  T(F): 98.1 (03 Sep 2017 14:34), Max: 99.6 (02 Sep 2017 20:59)  HR: 81 (03 Sep 2017 14:34) (81 - 112)  BP: 114/72 (03 Sep 2017 14:34) (104/63 - 124/77)  BP(mean): --  RR: 16 (03 Sep 2017 14:34) (16 - 16)  SpO2: 100% (03 Sep 2017 14:34) (95% - 100%)    PHYSICAL EXAM:      Constitutional: A&Ox3        Respiratory: non labored breathing, no respiratory distress    Cardiovascular: NSR, RRR    Gastrointestinal: Soft, mildly distended, mildly tender diffusely                  Incision: CDI    Genitourinary: voiding    Extremities: (-) edema                  I&O's Detail    02 Sep 2017 07:01  -  03 Sep 2017 07:00  --------------------------------------------------------  IN:    lactated ringers.: 320 mL    Oral Fluid: 2100 mL  Total IN: 2420 mL    OUT:    Drain: 32.5 mL    Drain: 67.5 mL    Drain: 30 mL    Voided: 3625 mL  Total OUT: 3755 mL    Total NET: -1335 mL      03 Sep 2017 07:01  -  03 Sep 2017 16:25  --------------------------------------------------------  IN:  Total IN: 0 mL    OUT:    Voided: 1940 mL  Total OUT: 1940 mL    Total NET: -1940 mL          LABS:                        7.2    10.9  )-----------( 111      ( 03 Sep 2017 06:59 )             20.8     09-03    128<L>  |  93<L>  |  11  ----------------------------<  115<H>  4.0   |  30  |  0.80    Ca    8.1<L>      03 Sep 2017 06:59            RADIOLOGY & ADDITIONAL STUDIES:

## 2017-09-03 NOTE — PROGRESS NOTE ADULT - ASSESSMENT
37yoM with lumbosacral stenosis s/p posterior L5-S1 fusion with anterior exposure POD3    patient is now passing flatus/ no BM  started Reg diet-tolerating   -pain/nausea control prn (minimize narcotics)  -OOBA/IS  -Appreciate care per primary team  -Signing off- please reconsult surgery with any concern  TEAM 1- [phone] 37yoM with lumbosacral stenosis s/p posterior L5-S1 fusion with anterior exposure POD3    patient is now passing flatus/ no BM  started Reg diet-tolerating   -pain/nausea control prn (minimize narcotics)  -OOBA/IS  -Appreciate care per primary team  TEAM 1- [id number] 37yoM with lumbosacral stenosis s/p posterior L5-S1 fusion with anterior exposure POD3, abd exam remains benign, pt found to have RP bleed on CT scan c/w post surgical changes, as per ortho/spine no intervention frmo a gen surg perspective at this time.     patient is now passing flatus/ no BM  started Reg diet-tolerating   -pain/nausea control prn (minimize narcotics)  -OOBA/IS  -Appreciate care per primary team  - Surgery signing off   TEAM 1- [phone]

## 2017-09-03 NOTE — PROGRESS NOTE ADULT - SUBJECTIVE AND OBJECTIVE BOX
36 yo M with a history of ITP  POD 2 L5/s1 posterior spinal fusion  received pulse dex as outpatient    reports abdominal discomfort  no bowel movement.  no flatus    ICU Vital Signs Last 24 Hrs  T(C): 36.8 (03 Sep 2017 08:40), Max: 37.6 (02 Sep 2017 20:59)  T(F): 98.3 (03 Sep 2017 08:40), Max: 99.6 (02 Sep 2017 20:59)  HR: 108 (03 Sep 2017 08:40) (100 - 112)  BP: 120/67 (03 Sep 2017 08:40) (104/63 - 155/93)  BP(mean): --  ABP: --  ABP(mean): --  RR: 16 (03 Sep 2017 08:40) (16 - 17)  SpO2: 95% (03 Sep 2017 08:40) (95% - 99%)      Gen:  A&OX3  Skin:  pale, no petecchaie.  Ecchymosis noted at the penis  HEENT:  conjunctivae pale, no mucocutaneous bleeding  ABD:  soft, NT, distended. Drains in place with serousanguinous fluid  EXT:  No c/c/e      PAST MEDICAL & SURGICAL HISTORY:  Purpura, idiopathic thrombocytopenia  Lumbosacral stenosis  History of microdiscectomy  History of cholecystectomy    MEDICATIONS  (STANDING):  acetaminophen   Tablet 975 milliGRAM(s) Oral every 8 hours  morphine ER Tablet 15 milliGRAM(s) Oral every 12 hours  simethicone 80 milliGRAM(s) Chew every 8 hours  ferrous    sulfate 325 milliGRAM(s) Oral daily    MEDICATIONS  (PRN):  naloxone Injectable 0.1 milliGRAM(s) IV Push every 3 minutes PRN For ANY of the following changes in patient status:  A. RR LESS THAN 10 breaths per minute, B. Oxygen saturation LESS THAN 90%, C. Sedation score of 6  ondansetron Injectable 4 milliGRAM(s) IV Push every 6 hours PRN Nausea  diazepam    Tablet 5 milliGRAM(s) Oral every 8 hours PRN muscle spasm  metoclopramide Injectable 5 milliGRAM(s) IV Push every 6 hours PRN nausea and or vomiting  oxyCODONE    IR 5 milliGRAM(s) Oral every 4 hours PRN Moderate Pain (4 - 6)  oxyCODONE    IR 10 milliGRAM(s) Oral every 4 hours PRN Severe Pain (7 - 10)  HYDROmorphone  Injectable 1 milliGRAM(s) IV Push every 1 hour PRN breakthrough pain                            9.7    13.4  )-----------( 155      ( 02 Sep 2017 06:51 )             26.3                         7.2    10.9  )-----------( 111      ( 03 Sep 2017 06:59 )             20.8           09-02    129<L>  |  92<L>  |  18  ----------------------------<  128<H>  4.6   |  27  |  1.00    Ca    8.7      02 Sep 2017 06:51    09-03    128<L>  |  93<L>  |  11  ----------------------------<  115<H>  4.0   |  30  |  0.80    Ca    8.1<L>      03 Sep 2017 06:59

## 2017-09-03 NOTE — PROGRESS NOTE ADULT - SUBJECTIVE AND OBJECTIVE BOX
SPINE NOTE    Patient seen and examined at bedside. He is resting comfortably. Pain is well controlled on current regimen. Denies back pain. Denies leg pain, numbness or tingling.  Slight abdomen distension without pain. + flatus, No BM. Denies nausea. No acute complaints.     Exam: Anterior and posterior incision clean, dry and intact. Some ecchymosis along posterior incision. Drains intact. ON Q pump in place. Bilateral lower extremity strength is 5/5 and sensation is grossly intact. No calf tenderness. DP palpable bilaterally. Slight abdomen distension, nontender.     A/P: POD # 3 s/p ASF/ PSF L5-S1  - monitor labs- anemia- follow medicine recs.  - follow drain output  - pain control  - PT  - Discussed with Dr Leonard.

## 2017-09-03 NOTE — CONSULT NOTE ADULT - PROBLEM SELECTOR RECOMMENDATION 9
Preop testing results reviewed. No medical contraindications for the proposed surgery.
- No actual episodes of SOB, but fatigue likely due to anemia as hgb went from 14 to 7 in 3 days  - Unclear source of blood loss; pt with tachycardia for the last 2 days and symptomatic anemia  - Trend CBC  - Concern for RP bleed given abdominal pain/distention, doubt GI bleed given pt's age, lack of history, and no BMs  - Given stable neuro exam doubt hematoma of spine  - Episodes of "SOB" seem more like anxiety, but can be attributed to anemia. Check CXR. Doubt active pulmonary issues, but recommend c/w incentive spirometry

## 2017-09-03 NOTE — CONSULT NOTE ADULT - SUBJECTIVE AND OBJECTIVE BOX
37M with low back pain x 8 years which that began spontaneously and without accident or trauma. Pt reports that he has undergone multiple epidural injections over the years without lasting pain relief. He eventually underwent discectomy and laminectomy for a herniated disc in May 2016 with some improvement of his pain. However, his pain continues to progressively worsen without improvement and has become increasingly unresponsive to conservative management. Pt states the pain is constant without radiation and is exacerbated with prolonged sitting or standing. Positional changes also exacerbate pain.Surgery performed called because of anemia  Of note, pt has hematologist for history of ITP which is noted to be resolved due to pre-operative high dose steroids (decadron) tx.      REVIEW OF SYSTEMS:  Constitutional: No fever, weight loss or fatigue  ENMT:  No difficulty hearing, tinnitus, vertigo; No sinus or throat pain  Respiratory: No cough, wheezing, chills or hemoptysis  Cardiovascular: No chest pain, palpitations, dizziness or leg swelling  Gastrointestinal: No abdominal or epigastric pain. No nausea, vomiting or hematemesis; No diarrhea or constipation. No melena or hematochezia.  Skin: No itching, burning, rashes or lesions   Musculoskeletal: No joint pain or swelling; No muscle, back or extremity pain    PAST MEDICAL & SURGICAL HISTORY:  Purpura, idiopathic thrombocytopenia  Lumbosacral stenosis  History of microdiscectomy  History of cholecystectomy      FAMILY HISTORY:  No pertinent family history in first degree relatives      SOCIAL HISTORY:  Smoking Status: [ ] Current, [ ] Former, [ ] Never  Pack Years:    MEDICATIONS:  MEDICATIONS  (STANDING):  acetaminophen   Tablet 975 milliGRAM(s) Oral every 8 hours  morphine ER Tablet 15 milliGRAM(s) Oral every 12 hours  simethicone 80 milliGRAM(s) Chew every 8 hours  ferrous    sulfate 325 milliGRAM(s) Oral daily  docusate sodium 100 milliGRAM(s) Oral two times a day    MEDICATIONS  (PRN):  naloxone Injectable 0.1 milliGRAM(s) IV Push every 3 minutes PRN For ANY of the following changes in patient status:  A. RR LESS THAN 10 breaths per minute, B. Oxygen saturation LESS THAN 90%, C. Sedation score of 6  ondansetron Injectable 4 milliGRAM(s) IV Push every 6 hours PRN Nausea  diazepam    Tablet 5 milliGRAM(s) Oral every 8 hours PRN muscle spasm  metoclopramide Injectable 5 milliGRAM(s) IV Push every 6 hours PRN nausea and or vomiting  oxyCODONE    IR 5 milliGRAM(s) Oral every 4 hours PRN Moderate Pain (4 - 6)  oxyCODONE    IR 10 milliGRAM(s) Oral every 4 hours PRN Severe Pain (7 - 10)  HYDROmorphone  Injectable 1 milliGRAM(s) IV Push every 1 hour PRN breakthrough pain  bisacodyl 5 milliGRAM(s) Oral every 12 hours PRN Constipation      Allergies    No Known Allergies    Intolerances        Vital Signs Last 24 Hrs  T(C): 36.7 (03 Sep 2017 14:34), Max: 37.6 (02 Sep 2017 20:59)  T(F): 98.1 (03 Sep 2017 14:34), Max: 99.6 (02 Sep 2017 20:59)  HR: 108 (03 Sep 2017 15:00) (81 - 112)  BP: 145/77 (03 Sep 2017 15:00) (104/63 - 145/77)  BP(mean): --  RR: 16 (03 Sep 2017 14:34) (16 - 16)  SpO2: 99% (03 Sep 2017 15:00) (95% - 100%)    09-02 @ 07:01 - 09-03 @ 07:00  --------------------------------------------------------  IN: 2420 mL / OUT: 3755 mL / NET: -1335 mL    09-03 @ 07:01 - 09-03 @ 16:54  --------------------------------------------------------  IN: 0 mL / OUT: 1940 mL / NET: -1940 mL          PHYSICAL EXAM:    General: Well developed; well nourished;pale  in no acute distress  HEENT: MMM, conjunctiva and sclera clear  Gastrointestinal: firm, non-tender mod-distended; Normal bowel sounds; No rebound or guarding  Extremities: Normal range of motion, No clubbing, cyanosis or edema  Neurological: Alert and oriented x3  Skin: Warm and dry. No obvious rash      LABS:                        7.2    10.9  )-----------( 111      ( 03 Sep 2017 06:59 )             20.8     09-03    128<L>  |  93<L>  |  11  ----------------------------<  115<H>  4.0   |  30  |  0.80    Ca    8.1<L>      03 Sep 2017 06:59            RADIOLOGY & ADDITIONAL STUDIES:

## 2017-09-03 NOTE — CONSULT NOTE ADULT - ATTENDING COMMENTS
37yoM with lumbosacral stenosis s/p posterior L5-S1 fusion with anterior exposure POD3
No record of hypoxia, patient does have acute blood loss anemia as stated above, recommend CT scan. Transfusion threshold deferred to primary team.   Incentive spirometer to prevent atelectasis, not requiring supplemental oxygen at this time.

## 2017-09-03 NOTE — CHART NOTE - NSCHARTNOTEFT_GEN_A_CORE
Am Hgb 7.2, Pulm fellow and GI consulted to eval for possible RP bleed as cause of anemia  Received call from radiology with read of CT A/P ordered by GI that showed RP hematoma  Results discussed with Dr. Leonard, confirmed findings c/w post-surgical changes, no acute orthopedic intervention indicated  Imaging results presented to gen surg Team 1 (Jigna) as per chief resident, no gen surg intervention    Pt. seen and examined at 630pm  NAD, lying comfortably, but reported feeling tired  Abd softly distended, mildly tender  Neuro intact    F/u AM CBC  Bowel regiment for constipation

## 2017-09-03 NOTE — PROGRESS NOTE ADULT - SUBJECTIVE AND OBJECTIVE BOX
SUBJECTIVE:  Doing well.   No overnight events.     OBJECTIVE:     ** VITAL SIGNS / I&O's **    T(C): 37.5 (09-03-17 @ 04:56), Max: 37.6 (09-02-17 @ 20:59)  T(F): 99.5 (09-03-17 @ 04:56), Max: 99.6 (09-02-17 @ 20:59)  HR: 100 (09-03-17 @ 04:56) (100 - 112)  BP: 104/63 (09-03-17 @ 04:56) (104/63 - 155/93)  RR: 16 (09-03-17 @ 04:56) (16 - 17)  SpO2: 96% (09-03-17 @ 04:56) (96% - 99%)      02 Sep 2017 07:01  -  03 Sep 2017 07:00  --------------------------------------------------------  IN:    lactated ringers.: 320 mL    Oral Fluid: 2100 mL  Total IN: 2420 mL    OUT:    Drain: 32.5 mL    Drain: 67.5 mL    Drain: 30 mL    Voided: 3625 mL  Total OUT: 3755 mL    Total NET: -1335 mL          ** PHYSICAL EXAM **    -- CONSTITUTIONAL: AOx3. NAD.   -- CARDIOVASCULAR: Regular rate and rhythm. S1, S2.  -- RESPIRATORY: Bilateral breath sounds.   -- BACK: Lily-incisional ecchymosis. Small area of dermal dehiscence at [id number]-fz-ywsjvi 1/3. No collections. Drains serosanguinous.  -- NEUROLOGICAL: SILT in BLE. TA/TP, FHL/EHL, FDL/EDL intact bilaterally.      ** LABS **      03 Sep 2017 06:59    128    |  93     |  11     ----------------------------<  115    4.0     |  30     |  0.80     Ca    8.1        03 Sep 2017 06:59        CAPILLARY BLOOD GLUCOSE    CARDIAC MARKERS ( 02 Sep 2017 22:19 )  x     / <0.01 ng/mL / x     / x     / x

## 2017-09-03 NOTE — PROGRESS NOTE ADULT - SUBJECTIVE AND OBJECTIVE BOX
Patient seen and examined at bedside.     SUBJECTIVE: No acute events overnight.  Pain tolerable.    Denies Chest Pain/SOB.    Denies Headache.    Denies Nausea/vomiting.      Vital Signs Last 24 Hrs  T(C): 37.5 (03 Sep 2017 04:56), Max: 37.6 (02 Sep 2017 20:59)  T(F): 99.5 (03 Sep 2017 04:56), Max: 99.6 (02 Sep 2017 20:59)  HR: 100 (03 Sep 2017 04:56) (100 - 112)  BP: 104/63 (03 Sep 2017 04:56) (104/63 - 155/93)  BP(mean): --  RR: 16 (03 Sep 2017 04:56) (16 - 17)  SpO2: 96% (03 Sep 2017 04:56) (96% - 99%)    NAD, non labored respirations  Affected extremity:          Dressing: clean/dry/intact          Drains: none         Sensation: [x ] intact to light touch  [ ] decreased            Pain                  Vascular: [x ] warm well perfused; capillary refill <3 seconds          Motor exam: [x ]         [ ] Upper extremity                   Nae (c5)   Bi(c5/6)   WE(c6)   EE(c7)    (c8)                                                R           5              5            5             5             5                                               L            5              5            5             5            5         [x ] Lower extremity                   IP(L2)     Q(L3)     TA(L4)     EHL(L5)     GS(s1)                                                 R          5           5          5            5              5                                               L           5           5         5             5              5                  Passed flatus, per patient.     A/P :  37y Male s/p Ant Post L5-S1 PSF 8/31/17     -    Pain control + bowel regimen  -    DVT ppx: SCDs    -    Periop abx    -    ADAT  -    Check AM labs  -    Weight bearing status:   WBAT        -    Physical Therapy  -    Resume home meds  -    Dispo planning

## 2017-09-03 NOTE — PROGRESS NOTE ADULT - ASSESSMENT
37M with a history of ITP s/p L5-S1 posterior spinal fusion POD 2      s/p dexamethasone as outpatient with complete response.   Platelet count >100,000 but decreased(so has the WBC and HB, ?partial dilutional effect)  IV hydration was discontinue  decrease Hb, if continues to decrease ?bleeding  ferrous sulfate started  would repeat CBC later today    hyponatremia?etiology.  followed by medicine    please call with questions    Thank you    Madhuri Donald MD [facility] Dr[facility]    [phone]

## 2017-09-03 NOTE — CONSULT NOTE ADULT - ASSESSMENT
38 yo M with PMH ITP s/p ant-post spinal fusion of L5-S1 now with fatigue associated with progressive anemia and hyponatremia

## 2017-09-03 NOTE — CONSULT NOTE ADULT - PROBLEM SELECTOR RECOMMENDATION 2
Rx'd with decadron.  Will have hematology follow post op
- Suspect hypovolemia hyponatremia as pt net negative in addition to anemic and increase HR  - Can check UA and urine electrolytes (Na, Cl), urine osmolarity and serum osmolarity  - Consider NS bolus of 500cc and reassess HR

## 2017-09-03 NOTE — CONSULT NOTE ADULT - SUBJECTIVE AND OBJECTIVE BOX
Patient is a 37y old  Male who presents with a chief complaint of low back pain (01 Sep 2017 10:56)    HPI: 36 yo M with PMH ITP, lumbar stenosis now POD 2 for ant- posterior spinal fusion of L5-S1, pulmonary consulted for episode of hypoxia associated with anemia. Per pt, he denies feeling SOB - states he had anxiety with the amount of pain medication he was given and felt sedated. He became worried he would "stop breathing" if he fell asleep and would startle himself awake gasping, but never actually felt dyspneic. Has no pulmonary history - former smoker quit 10 yrs ago, intermittently smoked 6-7  i      PAST MEDICAL & SURGICAL HISTORY:  Purpura, idiopathic thrombocytopenia  Lumbosacral stenosis  History of microdiscectomy  History of cholecystectomy      FAMILY HISTORY:  No pertinent family history in first degree relatives      SOCIAL HISTORY:  Smoking Status: [ ] Current, [ ] Former, [ ] Never  Pack Years:    MEDICATIONS:  Pulmonary:    Antimicrobials:    Anticoagulants:    Onc:    GI/:  simethicone 80 milliGRAM(s) Chew every 8 hours  docusate sodium 100 milliGRAM(s) Oral two times a day  bisacodyl 5 milliGRAM(s) Oral every 12 hours PRN    Endocrine:    Cardiac:    Other Medications:  naloxone Injectable 0.1 milliGRAM(s) IV Push every 3 minutes PRN  ondansetron Injectable 4 milliGRAM(s) IV Push every 6 hours PRN  acetaminophen   Tablet 975 milliGRAM(s) Oral every 8 hours  diazepam    Tablet 5 milliGRAM(s) Oral every 8 hours PRN  metoclopramide Injectable 5 milliGRAM(s) IV Push every 6 hours PRN  morphine ER Tablet 15 milliGRAM(s) Oral every 12 hours  oxyCODONE    IR 5 milliGRAM(s) Oral every 4 hours PRN  oxyCODONE    IR 10 milliGRAM(s) Oral every 4 hours PRN  HYDROmorphone  Injectable 1 milliGRAM(s) IV Push every 1 hour PRN  ferrous    sulfate 325 milliGRAM(s) Oral daily      Allergies    No Known Allergies    Intolerances        Vital Signs Last 24 Hrs  T(C): 36.7 (03 Sep 2017 14:34), Max: 37.6 (02 Sep 2017 20:59)  T(F): 98.1 (03 Sep 2017 14:34), Max: 99.6 (02 Sep 2017 20:59)  HR: 81 (03 Sep 2017 14:34) (81 - 112)  BP: 114/72 (03 Sep 2017 14:34) (104/63 - 124/77)  BP(mean): --  RR: 16 (03 Sep 2017 14:34) (16 - 16)  SpO2: 100% (03 Sep 2017 14:34) (95% - 100%)    09-02 @ 07:01  -  09-03 @ 07:00  --------------------------------------------------------  IN: 2420 mL / OUT: 3755 mL / NET: -1335 mL    09-03 @ 07:01  -  09-03 @ 15:18  --------------------------------------------------------  IN: 0 mL / OUT: 1940 mL / NET: -1940 mL          LABS:      CBC Full  -  ( 03 Sep 2017 06:59 )  WBC Count : 10.9 K/uL  Hemoglobin : 7.2 g/dL  Hematocrit : 20.8 %  Platelet Count - Automated : 111 K/uL  Mean Cell Volume : 85.6 fL  Mean Cell Hemoglobin : 29.6 pg  Mean Cell Hemoglobin Concentration : 34.6 g/dL  Auto Neutrophil # : x  Auto Lymphocyte # : x  Auto Monocyte # : x  Auto Eosinophil # : x  Auto Basophil # : x  Auto Neutrophil % : 72.4 %  Auto Lymphocyte % : 12.2 %  Auto Monocyte % : 14.9 %  Auto Eosinophil % : 0.4 %  Auto Basophil % : 0.1 %    09-03    128<L>  |  93<L>  |  11  ----------------------------<  115<H>  4.0   |  30  |  0.80    Ca    8.1<L>      03 Sep 2017 06:59                        RADIOLOGY & ADDITIONAL STUDIES (The following images were personally reviewed): Patient is a 37y old  Male who presents with a chief complaint of low back pain (01 Sep 2017 10:56)    HPI: 38 yo M with PMH ITP, lumbar stenosis now POD 2 for ant- posterior spinal fusion of L5-S1, pulmonary consulted for episode of hypoxia associated with anemia. Per pt, he denies feeling SOB - states he had anxiety with the amount of pain medication he was given and felt sedated. He became worried he would "stop breathing" if he fell asleep and would startle himself awake gasping, but never actually felt dyspneic. Has no pulmonary history - former smoker quit 10 yrs ago, intermittently smoked 6-7  cig/day. His biggest complaints are fatigue/weakness when he walks and abdominal pain/distention/constipation. He has been passing gas, but no BM. Resumed eating yesterday, has been drinking water and urinating frequently. No history of bloody bowel movements, hematuria,       PAST MEDICAL & SURGICAL HISTORY:  Purpura, idiopathic thrombocytopenia  Lumbosacral stenosis  History of microdiscectomy  History of cholecystectomy      FAMILY HISTORY:  No pertinent family history in first degree relatives      SOCIAL HISTORY:  Smoking Status: [ ] Current, [ ] Former, [ ] Never  Pack Years:    MEDICATIONS:  Pulmonary:    Antimicrobials:    Anticoagulants:    Onc:    GI/:  simethicone 80 milliGRAM(s) Chew every 8 hours  docusate sodium 100 milliGRAM(s) Oral two times a day  bisacodyl 5 milliGRAM(s) Oral every 12 hours PRN    Endocrine:    Cardiac:    Other Medications:  naloxone Injectable 0.1 milliGRAM(s) IV Push every 3 minutes PRN  ondansetron Injectable 4 milliGRAM(s) IV Push every 6 hours PRN  acetaminophen   Tablet 975 milliGRAM(s) Oral every 8 hours  diazepam    Tablet 5 milliGRAM(s) Oral every 8 hours PRN  metoclopramide Injectable 5 milliGRAM(s) IV Push every 6 hours PRN  morphine ER Tablet 15 milliGRAM(s) Oral every 12 hours  oxyCODONE    IR 5 milliGRAM(s) Oral every 4 hours PRN  oxyCODONE    IR 10 milliGRAM(s) Oral every 4 hours PRN  HYDROmorphone  Injectable 1 milliGRAM(s) IV Push every 1 hour PRN  ferrous    sulfate 325 milliGRAM(s) Oral daily      Allergies    No Known Allergies    Intolerances        Vital Signs Last 24 Hrs  T(C): 36.7 (03 Sep 2017 14:34), Max: 37.6 (02 Sep 2017 20:59)  T(F): 98.1 (03 Sep 2017 14:34), Max: 99.6 (02 Sep 2017 20:59)  HR: 81 (03 Sep 2017 14:34) (81 - 112)  BP: 114/72 (03 Sep 2017 14:34) (104/63 - 124/77)  BP(mean): --  RR: 16 (03 Sep 2017 14:34) (16 - 16)  SpO2: 100% (03 Sep 2017 14:34) (95% - 100%)    09-02 @ 07:01  -  09-03 @ 07:00  --------------------------------------------------------  IN: 2420 mL / OUT: 3755 mL / NET: -1335 mL    09-03 @ 07:01 - 09-03 @ 15:18  --------------------------------------------------------  IN: 0 mL / OUT: 1940 mL / NET: -1940 mL          LABS:      CBC Full  -  ( 03 Sep 2017 06:59 )  WBC Count : 10.9 K/uL  Hemoglobin : 7.2 g/dL  Hematocrit : 20.8 %  Platelet Count - Automated : 111 K/uL  Mean Cell Volume : 85.6 fL  Mean Cell Hemoglobin : 29.6 pg  Mean Cell Hemoglobin Concentration : 34.6 g/dL  Auto Neutrophil # : x  Auto Lymphocyte # : x  Auto Monocyte # : x  Auto Eosinophil # : x  Auto Basophil # : x  Auto Neutrophil % : 72.4 %  Auto Lymphocyte % : 12.2 %  Auto Monocyte % : 14.9 %  Auto Eosinophil % : 0.4 %  Auto Basophil % : 0.1 %    09-03    128<L>  |  93<L>  |  11  ----------------------------<  115<H>  4.0   |  30  |  0.80    Ca    8.1<L>      03 Sep 2017 06:59                        RADIOLOGY & ADDITIONAL STUDIES (The following images were personally reviewed): Patient is a 37y old  Male who presents with a chief complaint of low back pain (01 Sep 2017 10:56)    HPI: 38 yo M with PMH ITP, lumbar stenosis now POD 2 for ant- posterior spinal fusion of L5-S1, pulmonary consulted for episode of hypoxia associated with anemia. Per pt, he denies feeling SOB - states he had anxiety with the amount of pain medication he was given and felt sedated. He became worried he would "stop breathing" if he fell asleep and would startle himself awake gasping, but never actually felt dyspneic. Has no pulmonary history - former smoker quit 10 yrs ago, intermittently smoked 6-7  cig/day. His biggest complaints are fatigue/weakness when he walks and abdominal pain/distention/constipation. He has been passing gas, but no BM. Resumed eating yesterday, has been drinking water and urinating frequently. No bloody bowel movements, hematuria, hemoptysis. Denies chest pain, palpitations, or syncope. Of note, no documented hypoxia on flowsheet, pt nurse and ortho resident unaware of any episodes of hypoxia.       PAST MEDICAL & SURGICAL HISTORY:  Purpura, idiopathic thrombocytopeni  Lumbosacral stenosis  History of microdiscectomy  History of cholecystectomy      FAMILY HISTORY:  No pertinent family history in first degree relatives      SOCIAL HISTORY:  Smoking Status: [ ] Current, [ X] Former, [ ] Never  Pack Years:    MEDICATIONS:  Pulmonary:    Antimicrobials:    Anticoagulants:    Onc:    GI/:  simethicone 80 milliGRAM(s) Chew every 8 hours  docusate sodium 100 milliGRAM(s) Oral two times a day  bisacodyl 5 milliGRAM(s) Oral every 12 hours PRN    Endocrine:    Cardiac:    Other Medications:  naloxone Injectable 0.1 milliGRAM(s) IV Push every 3 minutes PRN  ondansetron Injectable 4 milliGRAM(s) IV Push every 6 hours PRN  acetaminophen   Tablet 975 milliGRAM(s) Oral every 8 hours  diazepam    Tablet 5 milliGRAM(s) Oral every 8 hours PRN  metoclopramide Injectable 5 milliGRAM(s) IV Push every 6 hours PRN  morphine ER Tablet 15 milliGRAM(s) Oral every 12 hours  oxyCODONE    IR 5 milliGRAM(s) Oral every 4 hours PRN  oxyCODONE    IR 10 milliGRAM(s) Oral every 4 hours PRN  HYDROmorphone  Injectable 1 milliGRAM(s) IV Push every 1 hour PRN  ferrous    sulfate 325 milliGRAM(s) Oral daily      Allergies    No Known Allergies    Intolerances        Vital Signs Last 24 Hrs  T(C): 36.7 (03 Sep 2017 14:34), Max: 37.6 (02 Sep 2017 20:59)  T(F): 98.1 (03 Sep 2017 14:34), Max: 99.6 (02 Sep 2017 20:59)  HR: 81 (03 Sep 2017 14:34) (81 - 112)  BP: 114/72 (03 Sep 2017 14:34) (104/63 - 124/77)  BP(mean): --  RR: 16 (03 Sep 2017 14:34) (16 - 16)  SpO2: 100% (03 Sep 2017 14:34) (95% - 100%)    09-02 @ 07:01 - 09-03 @ 07:00  --------------------------------------------------------  IN: 2420 mL / OUT: 3755 mL / NET: -1335 mL    09-03 @ 07:01  -  09-03 @ 15:18  --------------------------------------------------------  IN: 0 mL / OUT: 1940 mL / NET: -1940 mL    Physical Exam:  General - Young male in NAD laying comfortably in bed  HEENT - conjunctival pallor, dry mucus membranes, EOMI  Neck - No JVD, supple  Respiratory - Trace crackles on the left  Cardiac - S1S2 RRR no M/R/G  Abdomen - Distended, tender in RLQ and LLQ  Ext - no C/C/E WWP  Back - Dressing over spine clean/dry/intact with drain; no tenderness over spine  Neuro - 5/5 Strength in B/L upper and lower extremities, sensation intact       LABS:      CBC Full  -  ( 03 Sep 2017 06:59 )  WBC Count : 10.9 K/uL  Hemoglobin : 7.2 g/dL  Hematocrit : 20.8 %  Platelet Count - Automated : 111 K/uL  Mean Cell Volume : 85.6 fL  Mean Cell Hemoglobin : 29.6 pg  Mean Cell Hemoglobin Concentration : 34.6 g/dL  Auto Neutrophil % : 72.4 %  Auto Lymphocyte % : 12.2 %  Auto Monocyte % : 14.9 %  Auto Eosinophil % : 0.4 %  Auto Basophil % : 0.1 %    09-03    128<L>  |  93<L>  |  11  ----------------------------<  115<H>  4.0   |  30  |  0.80    Ca    8.1<L>      03 Sep 2017 06:59                        RADIOLOGY & ADDITIONAL STUDIES (The following images were personally reviewed):

## 2017-09-03 NOTE — PROGRESS NOTE ADULT - ASSESSMENT
37M s/p spinal instrumentation and PRS closure. POD 2.  >> Daily dressing changes  >> Continue drains  >> Care as per Ortho Spine Team

## 2017-09-04 VITALS
OXYGEN SATURATION: 98 % | HEART RATE: 104 BPM | TEMPERATURE: 99 F | DIASTOLIC BLOOD PRESSURE: 68 MMHG | RESPIRATION RATE: 15 BRPM | SYSTOLIC BLOOD PRESSURE: 121 MMHG

## 2017-09-04 DIAGNOSIS — N50.1 VASCULAR DISORDERS OF MALE GENITAL ORGANS: ICD-10-CM

## 2017-09-04 LAB
ANION GAP SERPL CALC-SCNC: 9 MMOL/L — SIGNIFICANT CHANGE UP (ref 5–17)
BUN SERPL-MCNC: 9 MG/DL — SIGNIFICANT CHANGE UP (ref 7–23)
CALCIUM SERPL-MCNC: 8.2 MG/DL — LOW (ref 8.4–10.5)
CHLORIDE SERPL-SCNC: 95 MMOL/L — LOW (ref 96–108)
CO2 SERPL-SCNC: 30 MMOL/L — SIGNIFICANT CHANGE UP (ref 22–31)
CREAT SERPL-MCNC: 0.8 MG/DL — SIGNIFICANT CHANGE UP (ref 0.5–1.3)
GLUCOSE SERPL-MCNC: 104 MG/DL — HIGH (ref 70–99)
HCT VFR BLD CALC: 20.5 % — CRITICAL LOW (ref 39–50)
HGB BLD-MCNC: 7.2 G/DL — LOW (ref 13–17)
MCHC RBC-ENTMCNC: 29.6 PG — SIGNIFICANT CHANGE UP (ref 27–34)
MCHC RBC-ENTMCNC: 35.1 G/DL — SIGNIFICANT CHANGE UP (ref 32–36)
MCV RBC AUTO: 84.4 FL — SIGNIFICANT CHANGE UP (ref 80–100)
PLATELET # BLD AUTO: 129 K/UL — LOW (ref 150–400)
POTASSIUM SERPL-MCNC: 4.4 MMOL/L — SIGNIFICANT CHANGE UP (ref 3.5–5.3)
POTASSIUM SERPL-SCNC: 4.4 MMOL/L — SIGNIFICANT CHANGE UP (ref 3.5–5.3)
RBC # BLD: 2.43 M/UL — LOW (ref 4.2–5.8)
RBC # FLD: 13.3 % — SIGNIFICANT CHANGE UP (ref 10.3–16.9)
SODIUM SERPL-SCNC: 134 MMOL/L — LOW (ref 135–145)
WBC # BLD: 11.2 K/UL — HIGH (ref 3.8–10.5)
WBC # FLD AUTO: 11.2 K/UL — HIGH (ref 3.8–10.5)

## 2017-09-04 PROCEDURE — 80048 BASIC METABOLIC PNL TOTAL CA: CPT

## 2017-09-04 PROCEDURE — 84484 ASSAY OF TROPONIN QUANT: CPT

## 2017-09-04 PROCEDURE — 97116 GAIT TRAINING THERAPY: CPT

## 2017-09-04 PROCEDURE — C1889: CPT

## 2017-09-04 PROCEDURE — 36415 COLL VENOUS BLD VENIPUNCTURE: CPT

## 2017-09-04 PROCEDURE — 74176 CT ABD & PELVIS W/O CONTRAST: CPT

## 2017-09-04 PROCEDURE — 85027 COMPLETE CBC AUTOMATED: CPT

## 2017-09-04 PROCEDURE — 86900 BLOOD TYPING SEROLOGIC ABO: CPT

## 2017-09-04 PROCEDURE — 72110 X-RAY EXAM L-2 SPINE 4/>VWS: CPT

## 2017-09-04 PROCEDURE — 99232 SBSQ HOSP IP/OBS MODERATE 35: CPT

## 2017-09-04 PROCEDURE — C1713: CPT

## 2017-09-04 PROCEDURE — 86923 COMPATIBILITY TEST ELECTRIC: CPT

## 2017-09-04 PROCEDURE — 87075 CULTR BACTERIA EXCEPT BLOOD: CPT

## 2017-09-04 PROCEDURE — 95940 IONM IN OPERATNG ROOM 15 MIN: CPT

## 2017-09-04 PROCEDURE — 86850 RBC ANTIBODY SCREEN: CPT

## 2017-09-04 PROCEDURE — 85025 COMPLETE CBC W/AUTO DIFF WBC: CPT

## 2017-09-04 PROCEDURE — 88304 TISSUE EXAM BY PATHOLOGIST: CPT

## 2017-09-04 PROCEDURE — 87070 CULTURE OTHR SPECIMN AEROBIC: CPT

## 2017-09-04 PROCEDURE — 76000 FLUOROSCOPY <1 HR PHYS/QHP: CPT

## 2017-09-04 PROCEDURE — 86901 BLOOD TYPING SEROLOGIC RH(D): CPT

## 2017-09-04 RX ORDER — DIAZEPAM 5 MG
1 TABLET ORAL
Qty: 3 | Refills: 0 | OUTPATIENT
Start: 2017-09-04 | End: 2017-09-07

## 2017-09-04 RX ORDER — MORPHINE SULFATE 50 MG/1
1 CAPSULE, EXTENDED RELEASE ORAL
Qty: 14 | Refills: 0 | OUTPATIENT
Start: 2017-09-04 | End: 2017-09-11

## 2017-09-04 RX ORDER — OXYCODONE HYDROCHLORIDE 5 MG/1
1 TABLET ORAL
Qty: 42 | Refills: 0 | OUTPATIENT
Start: 2017-09-04 | End: 2017-09-11

## 2017-09-04 RX ADMIN — OXYCODONE HYDROCHLORIDE 10 MILLIGRAM(S): 5 TABLET ORAL at 14:29

## 2017-09-04 RX ADMIN — OXYCODONE HYDROCHLORIDE 10 MILLIGRAM(S): 5 TABLET ORAL at 05:15

## 2017-09-04 RX ADMIN — Medication 5 MILLIGRAM(S): at 01:00

## 2017-09-04 RX ADMIN — SIMETHICONE 80 MILLIGRAM(S): 80 TABLET, CHEWABLE ORAL at 14:29

## 2017-09-04 RX ADMIN — Medication 975 MILLIGRAM(S): at 05:15

## 2017-09-04 RX ADMIN — Medication 975 MILLIGRAM(S): at 14:29

## 2017-09-04 RX ADMIN — SIMETHICONE 80 MILLIGRAM(S): 80 TABLET, CHEWABLE ORAL at 05:15

## 2017-09-04 RX ADMIN — OXYCODONE HYDROCHLORIDE 10 MILLIGRAM(S): 5 TABLET ORAL at 06:15

## 2017-09-04 RX ADMIN — Medication 100 MILLIGRAM(S): at 05:15

## 2017-09-04 RX ADMIN — Medication 325 MILLIGRAM(S): at 12:09

## 2017-09-04 RX ADMIN — OXYCODONE HYDROCHLORIDE 10 MILLIGRAM(S): 5 TABLET ORAL at 10:28

## 2017-09-04 RX ADMIN — OXYCODONE HYDROCHLORIDE 10 MILLIGRAM(S): 5 TABLET ORAL at 11:00

## 2017-09-04 RX ADMIN — OXYCODONE HYDROCHLORIDE 10 MILLIGRAM(S): 5 TABLET ORAL at 14:49

## 2017-09-04 NOTE — PROGRESS NOTE ADULT - ASSESSMENT
37M with a history of ITP s/p L5-S1 posterior spinal fusion POD 2      s/p dexamethasone as outpatient with complete response.   Platelet count >100,000 and remains stable    CT cw presacral hematoma  Hb stable  ferrous sulfate started, would increase to TID with colace  follow Hb closely  would repeat CBC later today    hyponatremia improving    please call with questions    Thank you    Madhuri Donald MD for Dr. Vogt Located within [facility]    [phone]

## 2017-09-04 NOTE — PROGRESS NOTE ADULT - SUBJECTIVE AND OBJECTIVE BOX
Pain Management Progress Note - [facility] [phone]    HPI:  This is a 36 y/o M with h/o Lumbar sacral stenosis with worsening back pain s/p ASF & PSF L5-S1 on 8/31. Pt states his patient has been improved with Oxycontin, Oxycodone and especially Valium form spasm. He addressed his concerns about constipation with abdominal incisional pain.       Pertinent PMH: Pain at: _x__Back ___Neck___Knee ___Hip ___Shoulder ___ Opioid tolerance    Pain is __x_ sharp ____dull ___burning _x__achy ___ Intensity: ____ mild ____mod ____severe     Location ___x__surgical site _____cervical ___x__lumbar _x___abd _____upper ext____lower ext    Worse with _x___activity _x___movement _____physical therapy___ Rest    Improved with _x___medication __x__rest ____physical therapy      HYDROmorphone  Injectable  HYDROmorphone PCA (1 mG/mL)  HYDROmorphone PCA (1 mG/mL) Rescue Clinician Bolus  naloxone Injectable  ondansetron Injectable  acetaminophen   Tablet  ceFAZolin   IVPB  morphine  - Injectable  diazepam    Tablet  metoclopramide  metoclopramide Injectable  morphine  - Injectable  cyclobenzaprine  HYDROmorphone  Injectable  morphine ER Tablet  oxyCODONE    IR  HYDROmorphone  Injectable  simethicone  ferrous    sulfate  docusate sodium  bisacodyl      ROS: Const:  __-_febrile   Eyes:___ENT:___CV: __-_chest pain  Resp: _-___sob  GI:_-__nausea _-__vomiting __+__abd pain ___npo ___clears _+__full diet _-_bm  :___ Musk: _+__pain ___spasm  Skin:___ Neuro:  _-__dntxyxyz_-__ybkyoiyep____ numbness ___weakness ___paresthesia  Psych:___anxiety  Endo:___ Heme:___Allergy:___      09-04 @ 05:07512 mL/min/1.73M2      Hemoglobin: 7.2 g/dL (09-04 @ 05:45)  Hemoglobin: 8.0 g/dL (09-03 @ 17:00)  Hemoglobin: 7.2 g/dL (09-03 @ 06:59)        T(C): 36.6 (09-04-17 @ 09:50), Max: 37.1 (09-03-17 @ 16:37)  HR: 104 (09-04-17 @ 10:30) (81 - 109)  BP: 123/81 (09-04-17 @ 10:30) (114/72 - 145/77)  RR: 16 (09-04-17 @ 09:50) (16 - 16)  SpO2: 99% (09-04-17 @ 10:30) (98% - 100%)  Wt(kg): --     PHYSICAL EXAM:  Gen Appearance: _x__no acute distress __x_appropriate         Neuro: _x__SILT feet__x__ EOM Intact Psych: AAOX__, ___mood/affect appropriate        Eyes: _x__conjunctiva WNL  __x___ Pupils equal and round        ENT: _x__ears and nose atraumatic__x_ Hearing grossly intact        Neck: ___trachea midline, no visible masses ___thyroid without palpable mass    Resp: _x__Nml WOB____No tactile fremitus ___clear to auscultation    Cardio: _x__extremities free from edema ____pedal pulses palpable    GI/Abdomen: ___soft _____ Nontender___x___distended_____HSM    Lymphatic: ___no palpable nodes in neck  ___no palpable nodes calves and feet    Skin/Wound: ___Incision, _x__Dressing c/d/i,   ____surrounding tissues soft,  ___drain/chest tube present____    Muscular: EHL __5_/5  Gastrocnemius_5__/5    ___absent clubbing/cyanosis         ASSESSMENT:  This is a 37y old Male with a history of:  LUMBO SACRAL STENOSIS M4  LUMBO SACRAL STENOSIS M48  LUMBO SACRAL STENOSIS M48.07  No h/o HF  No pertinent family history in first degree relatives  Handoff  MEWS Score  Purpura, idiopathic thrombocytopenia  Lumbosacral stenosis  Lumbosacral stenosis  Pelvic hematoma in male  Hyponatremia  Anemia due to blood loss  Anxiety  Anticipatory anxiety  Sciatica, unspecified laterality  Purpura, idiopathic thrombocytopenia  Lumbosacral stenosis  History of microdiscectomy  History of cholecystectomy  with worsening back pain s/p ASF & PSF L5-S1 on 8/31. Pt states his patient has been improved with Oxycontin, Oxycodone and especially Valium form spasm. He addressed his concerns about constipation with abdominal incisional pain. Planning to dc home today.    Recommended Treatment PLAN:  1. Continue MS contin 15 mg po q12h for a week  2. Continue Oxycodone 5-10 mg po q4h prn  3. Continue Tylenol 975 mg po q8h  4. Valium 5mg po for spasm Pain Management Progress Note - [facility] [phone]  Pt was seen at 10:20am.    HPI:  This is a 38 y/o M with h/o Lumbar sacral stenosis with worsening back pain s/p ASF & PSF L5-S1 on 8/31. Pt states his patient has been improved with Oxycontin, Oxycodone and especially Valium form spasm. He addressed his concerns about constipation with abdominal incisional pain.       Pertinent PMH: Pain at: _x__Back ___Neck___Knee ___Hip ___Shoulder ___ Opioid tolerance    Pain is __x_ sharp ____dull ___burning _x__achy ___ Intensity: ____ mild ____mod ____severe     Location ___x__surgical site _____cervical ___x__lumbar _x___abd _____upper ext____lower ext    Worse with _x___activity _x___movement _____physical therapy___ Rest    Improved with _x___medication __x__rest ____physical therapy      HYDROmorphone  Injectable  HYDROmorphone PCA (1 mG/mL)  HYDROmorphone PCA (1 mG/mL) Rescue Clinician Bolus  naloxone Injectable  ondansetron Injectable  acetaminophen   Tablet  ceFAZolin   IVPB  morphine  - Injectable  diazepam    Tablet  metoclopramide  metoclopramide Injectable  morphine  - Injectable  cyclobenzaprine  HYDROmorphone  Injectable  morphine ER Tablet  oxyCODONE    IR  HYDROmorphone  Injectable  simethicone  ferrous    sulfate  docusate sodium  bisacodyl      ROS: Const:  __-_febrile   Eyes:___ENT:___CV: __-_chest pain  Resp: _-___sob  GI:_-__nausea _-__vomiting __+__abd pain ___npo ___clears _+__full diet _-_bm  :___ Musk: _+__pain ___spasm  Skin:___ Neuro:  _-__vwbuhhoo_-__pnctwhjhh____ numbness ___weakness ___paresthesia  Psych:___anxiety  Endo:___ Heme:___Allergy:___      09-04 @ 05:83958 mL/min/1.73M2      Hemoglobin: 7.2 g/dL (09-04 @ 05:45)  Hemoglobin: 8.0 g/dL (09-03 @ 17:00)  Hemoglobin: 7.2 g/dL (09-03 @ 06:59)        T(C): 36.6 (09-04-17 @ 09:50), Max: 37.1 (09-03-17 @ 16:37)  HR: 104 (09-04-17 @ 10:30) (81 - 109)  BP: 123/81 (09-04-17 @ 10:30) (114/72 - 145/77)  RR: 16 (09-04-17 @ 09:50) (16 - 16)  SpO2: 99% (09-04-17 @ 10:30) (98% - 100%)  Wt(kg): --     PHYSICAL EXAM:  Gen Appearance: _x__no acute distress __x_appropriate         Neuro: _x__SILT feet__x__ EOM Intact Psych: AAOX__, ___mood/affect appropriate        Eyes: _x__conjunctiva WNL  __x___ Pupils equal and round        ENT: _x__ears and nose atraumatic__x_ Hearing grossly intact        Neck: ___trachea midline, no visible masses ___thyroid without palpable mass    Resp: _x__Nml WOB____No tactile fremitus ___clear to auscultation    Cardio: _x__extremities free from edema ____pedal pulses palpable    GI/Abdomen: ___soft _____ Nontender___x___distended_____HSM    Lymphatic: ___no palpable nodes in neck  ___no palpable nodes calves and feet    Skin/Wound: ___Incision, _x__Dressing c/d/i,   ____surrounding tissues soft,  ___drain/chest tube present____    Muscular: EHL __5_/5  Gastrocnemius_5__/5    ___absent clubbing/cyanosis         ASSESSMENT:  This is a 37y old Male with a history of:  LUMBO SACRAL STENOSIS M4  LUMBO SACRAL STENOSIS M48  LUMBO SACRAL STENOSIS M48.07  No h/o HF  No pertinent family history in first degree relatives  Handoff  MEWS Score  Purpura, idiopathic thrombocytopenia  Lumbosacral stenosis  Lumbosacral stenosis  Pelvic hematoma in male  Hyponatremia  Anemia due to blood loss  Anxiety  Anticipatory anxiety  Sciatica, unspecified laterality  Purpura, idiopathic thrombocytopenia  Lumbosacral stenosis  History of microdiscectomy  History of cholecystectomy  with worsening back pain s/p ASF & PSF L5-S1 on 8/31. Pt states his patient has been improved with Oxycontin, Oxycodone and especially Valium form spasm. He addressed his concerns about constipation with abdominal incisional pain. Planning to dc home today.    Recommended Treatment PLAN:  1. Continue MS contin 15 mg po q12h for a week  2. Continue Oxycodone 5-10 mg po q4h prn  3. Continue Tylenol 975 mg po q8h  4. Valium 5mg po for spasm

## 2017-09-04 NOTE — PROGRESS NOTE ADULT - SUBJECTIVE AND OBJECTIVE BOX
Pt seen and examined CT noted.  No c/o.  No Bms    REVIEW OF SYSTEMS:  Constitutional: No fever, weight loss or fatigue  Cardiovascular: No chest pain, palpitations, dizziness or leg swelling  Gastrointestinal: No abdominal or epigastric pain. No nausea, vomiting or hematemesis; No BM  Skin: No itching, burning, rashes or lesions       MEDICATIONS:  MEDICATIONS  (STANDING):  acetaminophen   Tablet 975 milliGRAM(s) Oral every 8 hours  morphine ER Tablet 15 milliGRAM(s) Oral every 12 hours  simethicone 80 milliGRAM(s) Chew every 8 hours  ferrous    sulfate 325 milliGRAM(s) Oral daily  docusate sodium 100 milliGRAM(s) Oral two times a day    MEDICATIONS  (PRN):  naloxone Injectable 0.1 milliGRAM(s) IV Push every 3 minutes PRN For ANY of the following changes in patient status:  A. RR LESS THAN 10 breaths per minute, B. Oxygen saturation LESS THAN 90%, C. Sedation score of 6  ondansetron Injectable 4 milliGRAM(s) IV Push every 6 hours PRN Nausea  diazepam    Tablet 5 milliGRAM(s) Oral every 8 hours PRN muscle spasm  metoclopramide Injectable 5 milliGRAM(s) IV Push every 6 hours PRN nausea and or vomiting  oxyCODONE    IR 5 milliGRAM(s) Oral every 4 hours PRN Moderate Pain (4 - 6)  oxyCODONE    IR 10 milliGRAM(s) Oral every 4 hours PRN Severe Pain (7 - 10)  HYDROmorphone  Injectable 1 milliGRAM(s) IV Push every 1 hour PRN breakthrough pain  bisacodyl 5 milliGRAM(s) Oral every 12 hours PRN Constipation      Allergies    No Known Allergies    Intolerances        Vital Signs Last 24 Hrs  T(C): 36.6 (04 Sep 2017 09:50), Max: 37.1 (03 Sep 2017 16:37)  T(F): 97.9 (04 Sep 2017 09:50), Max: 98.8 (03 Sep 2017 16:37)  HR: 104 (04 Sep 2017 10:30) (81 - 109)  BP: 123/81 (04 Sep 2017 10:30) (114/72 - 145/77)  BP(mean): --  RR: 16 (04 Sep 2017 09:50) (16 - 16)  SpO2: 99% (04 Sep 2017 10:30) (98% - 100%)    09-03 @ 07:01  -  09-04 @ 07:00  --------------------------------------------------------  IN: 0 mL / OUT: 2720 mL / NET: -2720 mL        PHYSICAL EXAM:    General: Well developed; well nourished;pale in no acute distress  HEENT: MMM, conjunctiva and sclera clear  Gastrointestinal: less -distended; Normal bowel sounds; No hepatosplenomegaly  Skin: Warm and dry. No obvious rash    LABS:      CBC Full  -  ( 04 Sep 2017 05:45 )  WBC Count : 11.2 K/uL  Hemoglobin : 7.2 g/dL  Hematocrit : 20.5 %  Platelet Count - Automated : 129 K/uL  Mean Cell Volume : 84.4 fL  Mean Cell Hemoglobin : 29.6 pg  Mean Cell Hemoglobin Concentration : 35.1 g/dL  Auto Neutrophil # : x  Auto Lymphocyte # : x  Auto Monocyte # : x  Auto Eosinophil # : x  Auto Basophil # : x  Auto Neutrophil % : x  Auto Lymphocyte % : x  Auto Monocyte % : x  Auto Eosinophil % : x  Auto Basophil % : x    09-04    134<L>  |  95<L>  |  9   ----------------------------<  104<H>  4.4   |  30  |  0.80    Ca    8.2<L>      04 Sep 2017 05:45                        RADIOLOGY & ADDITIONAL STUDIES (The following images were personally reviewed):< from: CT Abdomen and Pelvis No Cont (09.03.17 @ 18:04) >  CT ABDOMEN AND PELVIS     < end of copied text >  < from: CT Abdomen and Pelvis No Cont (09.03.17 @ 18:04) >  1.  Large 10.2 cm presacral hematoma with hemorrhage extending throughout   the pelvis and bilateral paracolic gutters.  2.  Mild prominence of the right renal collecting system and right ureter   secondary to compression of the mid right ureter which courses adjacent   to the right lateral margin of the large pelvic hematoma.  3.  Layering fluid and gas within the subcutaneous fat posterior to the   dorsal paravertebral musculature of unknown etiology. Correlate with   recent surgical history.  4.  Air within the urinary bladder which is likely secondary to recent   instrumentation. Please correlate clinically.    < end of copied text >

## 2017-09-04 NOTE — PROGRESS NOTE ADULT - SUBJECTIVE AND OBJECTIVE BOX
Patient seen and examined at bedside.     SUBJECTIVE: No acute events overnight.  Pain tolerable.    Denies Chest Pain/SOB.    Denies Headache.    Denies Nausea/vomiting.    Vital Signs Last 24 Hrs  T(C): 36.3 (04 Sep 2017 05:13), Max: 37.1 (03 Sep 2017 16:37)  T(F): 97.4 (04 Sep 2017 05:13), Max: 98.8 (03 Sep 2017 16:37)  HR: 100 (04 Sep 2017 05:13) (81 - 109)  BP: 125/76 (04 Sep 2017 05:13) (114/72 - 145/77)  BP(mean): --  RR: 16 (04 Sep 2017 05:13) (16 - 16)  SpO2: 100% (04 Sep 2017 05:13) (95% - 100%)    NAD, non labored respirations  Affected extremity:          Dressing: clean/dry/intact          Drains: none         Sensation: [x ] intact to light touch  [ ] decreased            Pain                  Vascular: [x ] warm well perfused; capillary refill <3 seconds          Motor exam: [x ]         [ ] Upper extremity                   Nae (c5)   Bi(c5/6)   WE(c6)   EE(c7)    (c8)                                                R           5              5            5             5             5                                               L            5              5            5             5            5         [x ] Lower extremity                   IP(L2)     Q(L3)     TA(L4)     EHL(L5)     GS(s1)                                                 R          5           5          5            5              5                                               L           5           5         5             5              5                  Passed flatus, per patient.     A/P :  37y Male s/p Ant Post L5-S1 PSF 8/31/17     -    Pain control + bowel regimen  -    DVT ppx: SCDs    -    Periop abx    -    ADAT  -    Check AM labs  -    Weight bearing status:   WBAT        -    Physical Therapy  -    Resume home meds  -    Dispo planning  - Cleared by gen davies and Dr Leonard from retrop bleed - NTD

## 2017-09-04 NOTE — PROGRESS NOTE ADULT - SUBJECTIVE AND OBJECTIVE BOX
SUBJECTIVE:  Doing well.   No overnight events.     OBJECTIVE:     ** VITAL SIGNS / I&O's **    T(C): 36.3 (09-04-17 @ 05:13), Max: 37.1 (09-03-17 @ 16:37)  T(F): 97.4 (09-04-17 @ 05:13), Max: 98.8 (09-03-17 @ 16:37)  HR: 100 (09-04-17 @ 05:13) (81 - 109)  BP: 125/76 (09-04-17 @ 05:13) (114/72 - 145/77)  RR: 16 (09-04-17 @ 05:13) (16 - 16)  SpO2: 100% (09-04-17 @ 05:13) (95% - 100%)      03 Sep 2017 07:01  -  04 Sep 2017 07:00  --------------------------------------------------------  IN:  Total IN: 0 mL    OUT:    Voided: 2720 mL  Total OUT: 2720 mL    Total NET: -2720 mL          ** PHYSICAL EXAM **    -- CONSTITUTIONAL: AOx3. NAD.   -- CARDIOVASCULAR: Regular rate and rhythm. S1, S2.  -- RESPIRATORY: Bilateral breath sounds.   -- BACK: Lily-incisional ecchymosis. Small 2mm area of dermal dehiscence at [id number] 1/3. No collections.   -- NEUROLOGICAL: SILT in BLE. TA/TP, FHL/EHL, FDL/EDL intact bilaterally.      ** LABS **                          7.2    11.2  )-----------( 129      ( 04 Sep 2017 05:45 )             20.5     04 Sep 2017 05:45    134    |  95     |  9      ----------------------------<  104    4.4     |  30     |  0.80     Ca    8.2        04 Sep 2017 05:45        CAPILLARY BLOOD GLUCOSE

## 2017-09-04 NOTE — PROGRESS NOTE ADULT - PROBLEM SELECTOR PLAN 1
Discussed with house staff and nursing staff.  Pain management, PT, incentive spirometer, DVT prophylaxis, repeat labs and discharge planning.
Discussed with house staff and nursing staff.  Pain management, PT, incentive spirometer, DVT prophylaxis, repeat labs and discharge planning.
presacral hematoma on CT
s/p dexamethasone as outpatient with complete response.   currently with normal platelet count  continue to monitor during hospitalization

## 2017-09-04 NOTE — PROGRESS NOTE ADULT - ASSESSMENT
37M s/p spinal instrumentation and PRS closure  >> Daily dressing changes  >> Dispo. planning  >> Care as per Ortho Spine Team

## 2017-09-04 NOTE — PROGRESS NOTE ADULT - PROBLEM SELECTOR PROBLEM 1
Purpura, idiopathic thrombocytopenia
Anemia due to blood loss
Lumbosacral stenosis
Lumbosacral stenosis
Purpura, idiopathic thrombocytopenia

## 2017-09-05 LAB — SURGICAL PATHOLOGY STUDY: SIGNIFICANT CHANGE UP

## 2017-09-06 DIAGNOSIS — Z87.891 PERSONAL HISTORY OF NICOTINE DEPENDENCE: ICD-10-CM
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
## 2017-09-06 DIAGNOSIS — G89.29 OTHER CHRONIC PAIN: ICD-10-CM

## 2017-09-06 DIAGNOSIS — M96.840 POSTPROCEDURAL HEMATOMA OF A MUSCULOSKELETAL STRUCTURE FOLLOWING A MUSCULOSKELETAL SYSTEM PROCEDURE: ICD-10-CM

## 2017-09-06 DIAGNOSIS — M47.9 SPONDYLOSIS, UNSPECIFIED: ICD-10-CM

## 2017-09-06 DIAGNOSIS — M48.07 SPINAL STENOSIS, LUMBOSACRAL REGION: ICD-10-CM

## 2017-09-06 DIAGNOSIS — D69.3 IMMUNE THROMBOCYTOPENIC PURPURA: ICD-10-CM

## 2017-09-06 DIAGNOSIS — D62 ACUTE POSTHEMORRHAGIC ANEMIA: ICD-10-CM

## 2017-09-06 DIAGNOSIS — M51.17 INTERVERTEBRAL DISC DISORDERS WITH RADICULOPATHY, LUMBOSACRAL REGION: ICD-10-CM

## 2017-09-06 DIAGNOSIS — F41.8 OTHER SPECIFIED ANXIETY DISORDERS: ICD-10-CM

## 2017-09-06 DIAGNOSIS — E87.1 HYPO-OSMOLALITY AND HYPONATREMIA: ICD-10-CM

## 2020-03-10 PROBLEM — D69.3 IMMUNE THROMBOCYTOPENIC PURPURA: Chronic | Status: ACTIVE | Noted: 2017-08-30

## 2020-03-10 PROBLEM — M48.07 SPINAL STENOSIS, LUMBOSACRAL REGION: Chronic | Status: ACTIVE | Noted: 2017-08-30

## 2020-03-24 PROBLEM — Z00.00 ENCOUNTER FOR PREVENTIVE HEALTH EXAMINATION: Status: ACTIVE | Noted: 2020-03-24

## 2020-03-30 ENCOUNTER — RECORD ABSTRACTING (OUTPATIENT)
Age: 40
End: 2020-03-30

## 2020-03-30 ENCOUNTER — APPOINTMENT (OUTPATIENT)
Dept: NEUROLOGY | Facility: CLINIC | Age: 40
End: 2020-03-30
Payer: COMMERCIAL

## 2020-03-30 ENCOUNTER — NON-APPOINTMENT (OUTPATIENT)
Age: 40
End: 2020-03-30

## 2020-03-30 PROCEDURE — 99203 OFFICE O/P NEW LOW 30 MIN: CPT | Mod: 95

## 2020-03-30 RX ORDER — GABAPENTIN 300 MG/1
300 CAPSULE ORAL 3 TIMES DAILY
Qty: 90 | Refills: 3 | Status: ACTIVE | COMMUNITY
Start: 2020-03-30 | End: 1900-01-01

## 2020-04-23 ENCOUNTER — APPOINTMENT (OUTPATIENT)
Dept: NEUROLOGY | Facility: CLINIC | Age: 40
End: 2020-04-23
Payer: COMMERCIAL

## 2020-04-23 DIAGNOSIS — M54.16 RADICULOPATHY, LUMBAR REGION: ICD-10-CM

## 2020-04-23 PROCEDURE — 99213 OFFICE O/P EST LOW 20 MIN: CPT | Mod: 95

## 2020-04-23 NOTE — HISTORY OF PRESENT ILLNESS
[Home] : at home, [unfilled] , at the time of the visit. [Other Location: e.g. Home (Enter Location, City,State)___] : at [unfilled] [FreeTextEntry1] : 39 year old male with back pain and radicular symptoms in the LE \par  s/p surgery- he has been on Gabapentin 300mg TID without benefit-  multiple lumbar injections \par no bowel or bladder symptoms - no headache or neck pain\par \par  Awake and alert\par  Full EOM- no facial weakness\par Gait - no foot drop\par  He can get on his heels and toes -\par  Negative Romberg \par No tremor or dysmetria\par \par  Lumbar radiculopathy\par  Increase gabapentin to a 4th tab at bedtime - 3 days per week.\par I spoke to Dr Ch - ortho surgeon who will see him for FU\par \par  [Patient] : the patient

## 2020-08-17 ENCOUNTER — APPOINTMENT (OUTPATIENT)
Dept: DISASTER EMERGENCY | Facility: CLINIC | Age: 40
End: 2020-08-17

## 2020-08-17 DIAGNOSIS — Z01.818 ENCOUNTER FOR OTHER PREPROCEDURAL EXAMINATION: ICD-10-CM

## 2020-08-18 ENCOUNTER — TRANSCRIPTION ENCOUNTER (OUTPATIENT)
Age: 40
End: 2020-08-18

## 2020-08-18 LAB — SARS-COV-2 N GENE NPH QL NAA+PROBE: NOT DETECTED

## 2020-08-19 ENCOUNTER — TRANSCRIPTION ENCOUNTER (OUTPATIENT)
Age: 40
End: 2020-08-19

## 2020-08-19 RX ORDER — POVIDONE-IODINE 5 %
1 AEROSOL (ML) TOPICAL ONCE
Refills: 0 | Status: COMPLETED | OUTPATIENT
Start: 2020-08-20 | End: 2020-08-20

## 2020-08-19 NOTE — H&P ADULT - HISTORY OF PRESENT ILLNESS
41 yo M with back pain x     He has a history of anterior and posterior fusion of L5-S1 in 2017 with Dr. Leonard.  Presents today for elective L4-L5 PSF/PLIF. 39 yo M with back pain x chronic. Has had conservative treatment but still symptomatic.    He has a history of anterior and posterior fusion of L5-S1 in 2017 with Dr. Leonard.  Presents today for elective L4-L5 Posterior Spinal Fusion.    Pain radiates to b/l legs but right > left. Also admits to tingling of legs (right > left).   No weakness of LE. No GI/ incontinence. No fever, chills, SOB, cough, recent illness or other complaints.

## 2020-08-19 NOTE — H&P ADULT - NSHPPHYSICALEXAM_GEN_ALL_CORE
MSK: decreased ROM of lumbar spine secondary to pain    Rest of PE per medical clearance NAD. Alert & oriented.    MSK: decreased ROM of lumbar spine secondary to pain  ehl, tib ant, GS 5/5 b/l LE  calf soft, compressible b/l  DP palpable b/l  gross sensation intact to light touch b/l LE    Rest of PE per medical clearance

## 2020-08-19 NOTE — H&P ADULT - PROBLEM SELECTOR PLAN 1
Admit to Orthopedic service  For elective L4-L5 PSF/PLIF  Medically cleared for surgery by Dr. Cheng

## 2020-08-19 NOTE — H&P ADULT - NSHPLABSRESULTS_GEN_ALL_CORE
Preop CMP, PT/INR, PTT WNL  Preop CBC w/ platelet count of 116,000 - h/o ITP - reviewed by Heme - stable  Preop EKG - incomplete RBBB - reviewed per clearance  Preop Echo - 66% EF, trace ND - reviewed per clearance  COVID swab on 08/17 - negative  3M DOS  T&S DOS  UA DOS

## 2020-08-19 NOTE — H&P ADULT - PROBLEM/PLAN-1
[facility]  Neurosurgery  Progress Note    Subjective:     History of Present Illness: Marcelo Farias is 38 y.o. male with history of R petrous lesion, bell's palsy, Gerd, and anxiety who has chronic vertigo and dizziness from this lesion and seen by Dr. Velez today in clinic. Admitted to [facility] for worsening symptoms. Patient reports vertigo has significantly worsened since last visit in 4/2018. He now has vertigo every 2 days and associated blurry vision during these episodes. He also has continued R ear fullness/swishing/ringing that is slightly worsened as well. He feels that R facial weakness has not changed and intermittently spontaneous R facial twitching.     Of note, he went to urgent care on Friday and was prescribed omnicef and steroids per presumed ear infection. He is unable to tolerate steroids as it worsens his anxiety.     Post-Op Info:  * No surgery found *         Interval History: Pt denies headache, vision changes, numbness/tingling, n/v, or increased vertigo. Ready to be discharged home. ENT recommending outpatient follow up.     Medications:  Continuous Infusions:  Scheduled Meds:   pantoprazole  40 mg Oral Daily    polyethylene glycol  17 g Oral Daily    senna-docusate 8.6-50 mg  2 tablet Oral BID    sertraline  50 mg Oral Daily     PRN Meds:acetaminophen, dextrose 50%, dextrose 50%, diazePAM, glucagon (human recombinant), glucose, glucose, glucose, LORazepam, meclizine, ondansetron, ondansetron     Review of Systems  Objective:     Weight: 90 kg (198 lb 6.6 oz)  Body mass index is 31.08 kg/m².  Vital Signs (Most Recent):  Temp: 98.4 °F (36.9 °C) (06/08/18 0800)  Pulse: 68 (06/08/18 1101)  Resp: 18 (06/08/18 0800)  BP: 127/74 (06/08/18 0800)  SpO2: 97 % (06/08/18 0800) Vital Signs (24h Range):  Temp:  [98.2 °F (36.8 °C)-98.6 °F (37 °C)] 98.4 °F (36.9 °C)  Pulse:  [58-79] 68  Resp:  [16-18] 18  SpO2:  [97 %-99 %] 97 %  BP: (102-127)/(56-74) 127/74                            Neurosurgery Physical Exam  General: well developed, well nourished, no distress.   Head: normocephalic, atraumatic  Neurologic: Alert and oriented. Thought content appropriate.  GCS: Motor: 6/Verbal: 5/Eyes: 4 GCS Total: 15  Mental Status: Awake, Alert, Oriented x 4  Language: No aphasia  Speech: No dysarthria  Cranial nerves: face symmetric, tongue midline, CN II-XII grossly intact.   Eyes: pupils equal, round, reactive to light with accomodation, EOMI.   Pulmonary: normal respirations, no signs of respiratory distress  Abdomen: soft, non-distended, not tender to palpation  Sensory: intact to light touch throughout    Motor Strength: Moves all extremities spontaneously with good tone.  Full strength upper and lower extremities. No abnormal movements seen.     Strength  Deltoids Triceps Biceps Wrist Extension Wrist Flexion Hand    Upper: R 5/5 5/5 5/5 5/5 5/5 5/5    L 5/5 5/5 5/5 5/5 5/5 5/5     Iliopsoas Quadriceps Knee  Flexion Tibialis  anterior Gastro- cnemius EHL   Lower: R 5/5 5/5 5/5 5/5 5/5 5/5    L 5/5 5/5 5/5 5/5 5/5 5/5     Pronator Drift: no drift noted  Finger-to-nose: Intact bilaterally  Hubbard: absent  Clonus: absent  Pulses: 2+ and symmetric radial and dorsalis pedis.  Skin: Skin is warm, dry and intact.  Groin site soft with no erythema or bleeding. No evidence of hematoma.       Significant Labs:    Recent Labs  Lab 06/07/18  0546 06/08/18  0509    110    138   K 4.0 4.1    105   CO2 23 25   BUN 19 14   CREATININE 1.2 1.1   CALCIUM 9.2 9.2       Recent Labs  Lab 06/07/18  0546 06/08/18  0509   WBC 7.39 7.65   HGB 15.0 14.2   HCT 44.2 42.6    253     No results for input(s): LABPT, INR, APTT in the last 48 hours.  Microbiology Results (last 7 days)     ** No results found for the last 168 hours. **        Recent Lab Results       06/08/18  0509      Immature Granulocytes 0.3     Immature Grans (Abs) 0.02  Comment:  Mild elevation in immature granulocytes is non  specific and   can be seen in a variety of conditions including stress response,   acute inflammation, trauma and pregnancy. Correlation with other   laboratory and clinical findings is essential.       Anion Gap 8     Baso # 0.05     Basophil% 0.7     BUN, Bld 14     Calcium 9.2     Chloride 105     CO2 25     Creatinine 1.1     Differential Method Automated     eGFR if African American >60.0     eGFR if non  >60.0  Comment:  Calculation used to obtain the estimated glomerular filtration  rate (eGFR) is the CKD-EPI equation.        Eos # 0.2     Eosinophil% 3.0     Glucose 110     Gran # (ANC) 4.5     Gran% 58.5     Hematocrit 42.6     Hemoglobin 14.2     Lymph # 2.2     Lymph% 29.0     MCH 28.5     MCHC 33.3     MCV 85     Mono # 0.7     Mono% 8.5     MPV 9.6     nRBC 0     Platelets 253     Potassium 4.1     RBC 4.99     RDW 12.3     Sodium 138     WBC 7.65         All pertinent labs from the last 24 hours have been reviewed.    Significant Diagnostics:  No new imaging    Assessment/Plan:     * Mass of petrous temporal bone    38 y.o. male with history of R petrous lesion, bell's palsy, Gerd, and anxiety who has chronic vertigo and dizziness from this lesion and seen by Dr. Velez today in clinic. 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     -Patient neurostable on exam   -Cerebral angiogram 6/7 with no evidence of vascular abnormalities. No embolization.   -Continue home meds for GERD, anxiety, and vertigo.   -Continue Ativan 1 mg daily prn for anxiety.  -Zofran prn nausea.   -ENT consulted, recommending observation at this time. Will make sure appropriate follow up is scheduled.  -Pt stable for dc home. Will schedule 2 wk groin site check.  -Will discuss with Dr. Velez when pt is to follow up in NSGY clinic                Alisa Sotelo PA-C  Neurosurgery  [facility]   DISPLAY PLAN FREE TEXT

## 2020-08-20 ENCOUNTER — INPATIENT (INPATIENT)
Facility: HOSPITAL | Age: 40
LOS: 1 days | Discharge: HOME | DRG: 460 | End: 2020-08-22
Attending: SPECIALIST | Admitting: SPECIALIST
Payer: COMMERCIAL

## 2020-08-20 VITALS
HEIGHT: 70 IN | WEIGHT: 194.45 LBS | RESPIRATION RATE: 18 BRPM | DIASTOLIC BLOOD PRESSURE: 95 MMHG | SYSTOLIC BLOOD PRESSURE: 134 MMHG | TEMPERATURE: 98 F | HEART RATE: 79 BPM | OXYGEN SATURATION: 99 %

## 2020-08-20 DIAGNOSIS — F41.1 GENERALIZED ANXIETY DISORDER: ICD-10-CM

## 2020-08-20 DIAGNOSIS — D62 ACUTE POSTHEMORRHAGIC ANEMIA: ICD-10-CM

## 2020-08-20 DIAGNOSIS — D69.3 IMMUNE THROMBOCYTOPENIC PURPURA: ICD-10-CM

## 2020-08-20 DIAGNOSIS — M48.061 SPINAL STENOSIS, LUMBAR REGION WITHOUT NEUROGENIC CLAUDICATION: ICD-10-CM

## 2020-08-20 DIAGNOSIS — Z98.890 OTHER SPECIFIED POSTPROCEDURAL STATES: Chronic | ICD-10-CM

## 2020-08-20 DIAGNOSIS — J30.9 ALLERGIC RHINITIS, UNSPECIFIED: ICD-10-CM

## 2020-08-20 DIAGNOSIS — M48.07 SPINAL STENOSIS, LUMBOSACRAL REGION: ICD-10-CM

## 2020-08-20 DIAGNOSIS — M51.16 INTERVERTEBRAL DISC DISORDERS WITH RADICULOPATHY, LUMBAR REGION: ICD-10-CM

## 2020-08-20 DIAGNOSIS — G89.29 OTHER CHRONIC PAIN: ICD-10-CM

## 2020-08-20 DIAGNOSIS — F41.8 OTHER SPECIFIED ANXIETY DISORDERS: ICD-10-CM

## 2020-08-20 DIAGNOSIS — Z90.49 ACQUIRED ABSENCE OF OTHER SPECIFIED PARTS OF DIGESTIVE TRACT: ICD-10-CM

## 2020-08-20 DIAGNOSIS — Z90.49 ACQUIRED ABSENCE OF OTHER SPECIFIED PARTS OF DIGESTIVE TRACT: Chronic | ICD-10-CM

## 2020-08-20 DIAGNOSIS — Z98.1 ARTHRODESIS STATUS: ICD-10-CM

## 2020-08-20 LAB
APPEARANCE UR: CLEAR — SIGNIFICANT CHANGE UP
BILIRUB UR-MCNC: NEGATIVE — SIGNIFICANT CHANGE UP
COLOR SPEC: YELLOW — SIGNIFICANT CHANGE UP
DIFF PNL FLD: NEGATIVE — SIGNIFICANT CHANGE UP
GLUCOSE UR QL: NEGATIVE — SIGNIFICANT CHANGE UP
HCT VFR BLD CALC: 33.5 % — LOW (ref 39–50)
HGB BLD-MCNC: 11.7 G/DL — LOW (ref 13–17)
KETONES UR-MCNC: NEGATIVE — SIGNIFICANT CHANGE UP
LEUKOCYTE ESTERASE UR-ACNC: NEGATIVE — SIGNIFICANT CHANGE UP
MCHC RBC-ENTMCNC: 29.5 PG — SIGNIFICANT CHANGE UP (ref 27–34)
MCHC RBC-ENTMCNC: 34.9 GM/DL — SIGNIFICANT CHANGE UP (ref 32–36)
MCV RBC AUTO: 84.4 FL — SIGNIFICANT CHANGE UP (ref 80–100)
NITRITE UR-MCNC: NEGATIVE — SIGNIFICANT CHANGE UP
NRBC # BLD: 0 /100 WBCS — SIGNIFICANT CHANGE UP (ref 0–0)
PH UR: 6 — SIGNIFICANT CHANGE UP (ref 5–8)
PLATELET # BLD AUTO: 126 K/UL — LOW (ref 150–400)
PROT UR-MCNC: NEGATIVE MG/DL — SIGNIFICANT CHANGE UP
RBC # BLD: 3.97 M/UL — LOW (ref 4.2–5.8)
RBC # FLD: 12.3 % — SIGNIFICANT CHANGE UP (ref 10.3–14.5)
SP GR SPEC: 1.01 — SIGNIFICANT CHANGE UP (ref 1–1.03)
UROBILINOGEN FLD QL: 0.2 E.U./DL — SIGNIFICANT CHANGE UP
WBC # BLD: 14.84 K/UL — HIGH (ref 3.8–10.5)
WBC # FLD AUTO: 14.84 K/UL — HIGH (ref 3.8–10.5)

## 2020-08-20 RX ORDER — OXYCODONE HYDROCHLORIDE 5 MG/1
10 TABLET ORAL EVERY 4 HOURS
Refills: 0 | Status: DISCONTINUED | OUTPATIENT
Start: 2020-08-20 | End: 2020-08-22

## 2020-08-20 RX ORDER — DIAZEPAM 5 MG
5 TABLET ORAL EVERY 8 HOURS
Refills: 0 | Status: DISCONTINUED | OUTPATIENT
Start: 2020-08-20 | End: 2020-08-22

## 2020-08-20 RX ORDER — HYDROMORPHONE HYDROCHLORIDE 2 MG/ML
30 INJECTION INTRAMUSCULAR; INTRAVENOUS; SUBCUTANEOUS
Refills: 0 | Status: DISCONTINUED | OUTPATIENT
Start: 2020-08-20 | End: 2020-08-21

## 2020-08-20 RX ORDER — HYDROMORPHONE HYDROCHLORIDE 2 MG/ML
0.5 INJECTION INTRAMUSCULAR; INTRAVENOUS; SUBCUTANEOUS
Refills: 0 | Status: DISCONTINUED | OUTPATIENT
Start: 2020-08-20 | End: 2020-08-21

## 2020-08-20 RX ORDER — ONDANSETRON 8 MG/1
4 TABLET, FILM COATED ORAL EVERY 6 HOURS
Refills: 0 | Status: DISCONTINUED | OUTPATIENT
Start: 2020-08-20 | End: 2020-08-22

## 2020-08-20 RX ORDER — CHLORHEXIDINE GLUCONATE 213 G/1000ML
1 SOLUTION TOPICAL ONCE
Refills: 0 | Status: COMPLETED | OUTPATIENT
Start: 2020-08-20 | End: 2020-08-20

## 2020-08-20 RX ORDER — HYDROMORPHONE HYDROCHLORIDE 2 MG/ML
0.5 INJECTION INTRAMUSCULAR; INTRAVENOUS; SUBCUTANEOUS EVERY 4 HOURS
Refills: 0 | Status: DISCONTINUED | OUTPATIENT
Start: 2020-08-20 | End: 2020-08-22

## 2020-08-20 RX ORDER — SODIUM CHLORIDE 9 MG/ML
1000 INJECTION, SOLUTION INTRAVENOUS
Refills: 0 | Status: DISCONTINUED | OUTPATIENT
Start: 2020-08-20 | End: 2020-08-22

## 2020-08-20 RX ORDER — NALOXONE HYDROCHLORIDE 4 MG/.1ML
0.1 SPRAY NASAL
Refills: 0 | Status: DISCONTINUED | OUTPATIENT
Start: 2020-08-20 | End: 2020-08-22

## 2020-08-20 RX ORDER — HYDROMORPHONE HYDROCHLORIDE 2 MG/ML
0.5 INJECTION INTRAMUSCULAR; INTRAVENOUS; SUBCUTANEOUS
Refills: 0 | Status: DISCONTINUED | OUTPATIENT
Start: 2020-08-20 | End: 2020-08-20

## 2020-08-20 RX ORDER — OXYCODONE HYDROCHLORIDE 5 MG/1
5 TABLET ORAL EVERY 4 HOURS
Refills: 0 | Status: DISCONTINUED | OUTPATIENT
Start: 2020-08-20 | End: 2020-08-22

## 2020-08-20 RX ORDER — ACETAMINOPHEN 500 MG
975 TABLET ORAL EVERY 8 HOURS
Refills: 0 | Status: DISCONTINUED | OUTPATIENT
Start: 2020-08-20 | End: 2020-08-22

## 2020-08-20 RX ORDER — CEFAZOLIN SODIUM 1 G
2000 VIAL (EA) INJECTION EVERY 8 HOURS
Refills: 0 | Status: COMPLETED | OUTPATIENT
Start: 2020-08-20 | End: 2020-08-21

## 2020-08-20 RX ORDER — BUPIVACAINE 13.3 MG/ML
20 INJECTION, SUSPENSION, LIPOSOMAL INFILTRATION ONCE
Refills: 0 | Status: DISCONTINUED | OUTPATIENT
Start: 2020-08-20 | End: 2020-08-22

## 2020-08-20 RX ADMIN — CHLORHEXIDINE GLUCONATE 1 APPLICATION(S): 213 SOLUTION TOPICAL at 06:33

## 2020-08-20 RX ADMIN — Medication 1 APPLICATION(S): at 06:33

## 2020-08-20 RX ADMIN — ONDANSETRON 4 MILLIGRAM(S): 8 TABLET, FILM COATED ORAL at 21:01

## 2020-08-20 RX ADMIN — Medication 5 MILLIGRAM(S): at 13:49

## 2020-08-20 RX ADMIN — HYDROMORPHONE HYDROCHLORIDE 0.5 MILLIGRAM(S): 2 INJECTION INTRAMUSCULAR; INTRAVENOUS; SUBCUTANEOUS at 13:30

## 2020-08-20 RX ADMIN — Medication 100 MILLIGRAM(S): at 16:38

## 2020-08-20 RX ADMIN — HYDROMORPHONE HYDROCHLORIDE 0.5 MILLIGRAM(S): 2 INJECTION INTRAMUSCULAR; INTRAVENOUS; SUBCUTANEOUS at 14:59

## 2020-08-20 RX ADMIN — HYDROMORPHONE HYDROCHLORIDE 0.5 MILLIGRAM(S): 2 INJECTION INTRAMUSCULAR; INTRAVENOUS; SUBCUTANEOUS at 12:31

## 2020-08-20 RX ADMIN — HYDROMORPHONE HYDROCHLORIDE 30 MILLILITER(S): 2 INJECTION INTRAMUSCULAR; INTRAVENOUS; SUBCUTANEOUS at 15:02

## 2020-08-20 RX ADMIN — HYDROMORPHONE HYDROCHLORIDE 0.5 MILLIGRAM(S): 2 INJECTION INTRAMUSCULAR; INTRAVENOUS; SUBCUTANEOUS at 16:17

## 2020-08-20 NOTE — CONSULT NOTE ADULT - SUBJECTIVE AND OBJECTIVE BOX
Pain Management Consult Note - [facility] [phone]      Chief Complaint: Lower Back Pain      HPI: Patient seen and examined today. Patient with a history of purpura, lumbar stenosis, microdiscectomy, cholecystectomy s/p Laminectomy, PSF L4-L5 POD#0. Patient reports lower back pain that radiates down his R leg. Patient reports pain has worsened since December after going Bowling. Patient was managing the pain outpatient with Percocet 5-325mg PO Q6h, prescribed by Ria Samuels, Istop confirmed. Patient denies any secondary side effects from opioid use. Discussed plan to start a Dilaudid PCA postop, reviewed PCA use and side effects with patient at bedside. Patient verbalized understanding. Patient with x3 hemovac drains.       Pain is __x_ sharp ____dull ___burning _x__achy ___ Intensity: ____ mild _x__mod x_severe     Location _x___surgical site ____cervical _____lumbar ____abd ____upper ext____lower ext    Worse with x____activity __x__movement _____physical therapy___ Rest    Improved with __x__medication _x___rest ____physical therapy      ROS: Const:  _-__febrile   Eyes:___ENT:___CV: -__chest pain  Resp: _-__sob  GI:_-__nausea __-vomiting _-__abd pain _x__npo ___clears __full diet __bm  :___ Musk: _x__pain _x__spasm  Skin:___ Neuro:  _-__swfitjoo_-__wrazmaxaq_-__ numbness _-__weakness _-__paresth  Psych:__anxiety  Endo:___ Heme:___Allergy:_________, _x__all others reviewed and negative      PAST MEDICAL & SURGICAL HISTORY:  Purpura, idiopathic thrombocytopenia  Lumbosacral stenosis  History of microdiscectomy  History of cholecystectomy      SH: _-__Tobacco   _-__Alcohol                          FH:FAMILY HISTORY:      acetaminophen   Tablet .. 975 milliGRAM(s) Oral every 8 hours  BUpivacaine liposome 1.3% Injectable (no eMAR) 20 milliLiter(s) Local Injection once  ceFAZolin   IVPB 2000 milliGRAM(s) IV Intermittent every 8 hours  diazepam    Tablet 5 milliGRAM(s) Oral every 8 hours PRN  HYDROmorphone  Injectable 0.5 milliGRAM(s) IV Push every 15 minutes PRN  HYDROmorphone  Injectable 0.5 milliGRAM(s) IV Push every 4 hours PRN  HYDROmorphone PCA (1 mG/mL) 30 milliLiter(s) PCA Continuous PCA Continuous  HYDROmorphone PCA (1 mG/mL) Rescue Clinician Bolus 0.5 milliGRAM(s) IV Push every 2 hours PRN  lactated ringers. 1000 milliLiter(s) IV Continuous <Continuous>  naloxone Injectable 0.1 milliGRAM(s) IV Push every 3 minutes PRN  ondansetron Injectable 4 milliGRAM(s) IV Push every 6 hours PRN  oxyCODONE    IR 5 milliGRAM(s) Oral every 4 hours PRN  oxyCODONE    IR 10 milliGRAM(s) Oral every 4 hours PRN      T(C): 37.1 (08-20-20 @ 12:08), Max: 37.1 (08-20-20 @ 12:08)  HR: 92 (08-20-20 @ 12:47) (79 - 98)  BP: 103/72 (08-20-20 @ 12:47) (103/72 - 134/95)  RR: 8 (08-20-20 @ 12:47) (8 - 18)  SpO2: 98% (08-20-20 @ 12:47) (98% - 100%)  Wt(kg): --    T(C): 37.1 (08-20-20 @ 12:08), Max: 37.1 (08-20-20 @ 12:08)  HR: 92 (08-20-20 @ 12:47) (79 - 98)  BP: 103/72 (08-20-20 @ 12:47) (103/72 - 134/95)  RR: 8 (08-20-20 @ 12:47) (8 - 18)  SpO2: 98% (08-20-20 @ 12:47) (98% - 100%)  Wt(kg): --    T(C): 37.1 (08-20-20 @ 12:08), Max: 37.1 (08-20-20 @ 12:08)  HR: 92 (08-20-20 @ 12:47) (79 - 98)  BP: 103/72 (08-20-20 @ 12:47) (103/72 - 134/95)  RR: 8 (08-20-20 @ 12:47) (8 - 18)  SpO2: 98% (08-20-20 @ 12:47) (98% - 100%)  Wt(kg): --      PHYSICAL EXAM:  Gen Appearance: _x__no acute distress _x__appropriate        Neuro: _x__SILT feet____ EOM Intact Psych: AAOX_3_, _x__mood/affect appropriate        Eyes: _x__conjunctiva WNL  ___x__ Pupils equal and round        ENT: _x__ears and nose atraumatic__x_ Hearing grossly intact        Neck: _x__trachea midline, no visible masses ___thyroid without palpable mass    Resp: x___Nml WOB____No tactile fremitus ___clear to auscultation    Cardio: x___extremities free from edema ____pedal pulses palpable    GI/Abdomen: _x__soft __x___ Nontender__x____Nondistended_____HSM    Lymphatic: ___no palpable nodes in neck  ___no palpable nodes calves and feet    Skin/Wound: ___Incision, _x__Dressing c/d/i,   ____surrounding tissues soft,  _x__drain/chest tube present____    Muscular: EHL _5__/5  Gastrocnemius__5_/5    ___absent clubbing/cyanosis        ASSESSMENT: This is a 40y old Male with a history of purpura, lumbar stenosis, microdiscectomy, cholecysectomy, s/p Laminectomy, PSF L4-L5 POD#0.       Recommended Treatment PLAN:  1. Dilaudid PCA 0.2mg, Q6 minutes 9mg 4hour max  2. Dilaudid 0.5mg Q2h IVP prn breakthrough pain  3. Diazepam 5mg PO Q8h prn muscle spasms  Plan discussed with Dr. English Pain Management Consult Note - [facility] [phone]      Chief Complaint: Lower Back Pain      HPI: Patient seen and examined today. Patient with a history of purpura, lumbar stenosis, microdiscectomy, cholecystectomy s/p Laminectomy, PSF L4-L5 POD#0. Patient reports lower back pain that radiates down his R leg. Patient reports pain has worsened since December after going Bowling. Patient was managing the pain outpatient with Percocet 5-325mg PO Q6h, prescribed by Ria Samuels, Istop confirmed. Patient denies any secondary side effects from opioid use. Discussed plan to start a Dilaudid PCA postop, reviewed PCA use and side effects with patient at bedside. Patient verbalized understanding. Patient with x3 hemovac drains.       Pain is __x_ sharp ____dull ___burning _x__achy ___ Intensity: ____ mild _x__mod x_severe     Location _x___surgical site ____cervical _____lumbar ____abd ____upper ext____lower ext    Worse with x____activity __x__movement _____physical therapy___ Rest    Improved with __x__medication _x___rest ____physical therapy      ROS: Const:  _-__febrile   Eyes:___ENT:___CV: -__chest pain  Resp: _-__sob  GI:_-__nausea __-vomiting _-__abd pain _x__npo ___clears __full diet __bm  :___ Musk: _x__pain _x__spasm  Skin:___ Neuro:  _-__nczuaiyg_-__jeysmswdu_-__ numbness _-__weakness _-__paresth  Psych:__anxiety  Endo:___ Heme:___Allergy:_________, _x__all others reviewed and negative      PAST MEDICAL & SURGICAL HISTORY:  Purpura, idiopathic thrombocytopenia  Lumbosacral stenosis  History of microdiscectomy  History of cholecystectomy      SH: _-__Tobacco   _-__Alcohol                          FH:FAMILY HISTORY:      acetaminophen   Tablet .. 975 milliGRAM(s) Oral every 8 hours  BUpivacaine liposome 1.3% Injectable (no eMAR) 20 milliLiter(s) Local Injection once  ceFAZolin   IVPB 2000 milliGRAM(s) IV Intermittent every 8 hours  diazepam    Tablet 5 milliGRAM(s) Oral every 8 hours PRN  HYDROmorphone  Injectable 0.5 milliGRAM(s) IV Push every 15 minutes PRN  HYDROmorphone  Injectable 0.5 milliGRAM(s) IV Push every 4 hours PRN  HYDROmorphone PCA (1 mG/mL) 30 milliLiter(s) PCA Continuous PCA Continuous  HYDROmorphone PCA (1 mG/mL) Rescue Clinician Bolus 0.5 milliGRAM(s) IV Push every 2 hours PRN  lactated ringers. 1000 milliLiter(s) IV Continuous <Continuous>  naloxone Injectable 0.1 milliGRAM(s) IV Push every 3 minutes PRN  ondansetron Injectable 4 milliGRAM(s) IV Push every 6 hours PRN  oxyCODONE    IR 5 milliGRAM(s) Oral every 4 hours PRN  oxyCODONE    IR 10 milliGRAM(s) Oral every 4 hours PRN      T(C): 37.1 (08-20-20 @ 12:08), Max: 37.1 (08-20-20 @ 12:08)  HR: 92 (08-20-20 @ 12:47) (79 - 98)  BP: 103/72 (08-20-20 @ 12:47) (103/72 - 134/95)  RR: 8 (08-20-20 @ 12:47) (8 - 18)  SpO2: 98% (08-20-20 @ 12:47) (98% - 100%)  Wt(kg): --    T(C): 37.1 (08-20-20 @ 12:08), Max: 37.1 (08-20-20 @ 12:08)  HR: 92 (08-20-20 @ 12:47) (79 - 98)  BP: 103/72 (08-20-20 @ 12:47) (103/72 - 134/95)  RR: 8 (08-20-20 @ 12:47) (8 - 18)  SpO2: 98% (08-20-20 @ 12:47) (98% - 100%)  Wt(kg): --    T(C): 37.1 (08-20-20 @ 12:08), Max: 37.1 (08-20-20 @ 12:08)  HR: 92 (08-20-20 @ 12:47) (79 - 98)  BP: 103/72 (08-20-20 @ 12:47) (103/72 - 134/95)  RR: 8 (08-20-20 @ 12:47) (8 - 18)  SpO2: 98% (08-20-20 @ 12:47) (98% - 100%)  Wt(kg): --      PHYSICAL EXAM:  Gen Appearance: _x__no acute distress _x__appropriate        Neuro: _x__SILT feet____ EOM Intact Psych: AAOX_3_, _x__mood/affect appropriate        Eyes: _x__conjunctiva WNL  ___x__ Pupils equal and round        ENT: _x__ears and nose atraumatic__x_ Hearing grossly intact        Neck: _x__trachea midline, no visible masses ___thyroid without palpable mass    Resp: x___Nml WOB____No tactile fremitus ___clear to auscultation    Cardio: x___extremities free from edema ____pedal pulses palpable    GI/Abdomen: _x__soft __x___ Nontender__x____Nondistended_____HSM    Lymphatic: ___no palpable nodes in neck  ___no palpable nodes calves and feet    Skin/Wound: ___Incision, _x__Dressing c/d/i,   ____surrounding tissues soft,  _x__drain/chest tube present____    Muscular: EHL _5__/5  Gastrocnemius__5_/5    ___absent clubbing/cyanosis        ASSESSMENT: This is a 40y old Male with a history of purpura, lumbar stenosis, microdiscectomy, cholecysectomy, s/p Laminectomy, PSF L4-L5 POD#0.       Recommended Treatment PLAN:  1. Dilaudid PCA 0.2mg, Q6 minutes 9mg 4hour max  2. Dilaudid 0.5mg Q2h IVP prn breakthrough pain  3. Diazepam 5mg PO Q8h prn muscle spasms  Plan discussed with Dr. English at bedside

## 2020-08-20 NOTE — CONSULT NOTE ADULT - SUBJECTIVE AND OBJECTIVE BOX
HPI:  40 year old male with moderate chronic low back increased over time.  MRI confirmed spinal stenosis lumbar spine.  Patient has had conservative treatment but still symptomatic.  Symptoms worse with stairs and with twisting motions.  He has a history of anterior and posterior fusion of L5-S1 in 2017 with Dr. Leonard.  Presents today for elective L4-L5 Posterior Spinal Fusion.  Pain radiates to both legs but right > left.  Also admits to tingling of legs (right > left).   No weakness of LE. No GI/ incontinence. Patient today s/p lumbar laminectomy and fusion with moderate low back pain and malaise.    PAST MEDICAL & SURGICAL HISTORY:  Purpura, idiopathic thrombocytopenia  Denies DM HBP PUD ASTHMA  allergic rhinitis  Lumbosacral stenosis  History of microdiscectomy  History of cholecystectomy    REVIEW OF SYSTEMS    General:  malaise	  Skin/Breast: normal  Ophthalmologic: negative  ENMT:	normal  Respiratory and Thorax: normal  Cardiovascular:	normal  Gastrointestinal:	normal  Genitourinary:	normal  Musculoskeletal:	 normal   Neurological:	low back pain  Psychiatric:	anxiety  Hematology/Lymphatics:	negative  Endocrine:	negative  Allergic/Immunologic:	negative      MEDICATIONS  (STANDING):  acetaminophen   Tablet .. 975 milliGRAM(s) Oral every 8 hours  BUpivacaine liposome 1.3% Injectable (no eMAR) 20 milliLiter(s) Local Injection once  ceFAZolin   IVPB 2000 milliGRAM(s) IV Intermittent every 8 hours  HYDROmorphone PCA (1 mG/mL) 30 milliLiter(s) PCA Continuous PCA Continuous  lactated ringers. 1000 milliLiter(s) (100 mL/Hr) IV Continuous <Continuous>    MEDICATIONS  (PRN):  diazepam    Tablet 5 milliGRAM(s) Oral every 8 hours PRN muscle spasms  HYDROmorphone  Injectable 0.5 milliGRAM(s) IV Push every 15 minutes PRN Severe Pain (7 - 10)  HYDROmorphone  Injectable 0.5 milliGRAM(s) IV Push every 4 hours PRN breakthrough pain  HYDROmorphone PCA (1 mG/mL) Rescue Clinician Bolus 0.5 milliGRAM(s) IV Push every 2 hours PRN breakthrough pain  naloxone Injectable 0.1 milliGRAM(s) IV Push every 3 minutes PRN For ANY of the following changes in patient status:  A. RR LESS THAN 10 breaths per minute, B. Oxygen saturation LESS THAN 90%, C. Sedation score of 6  ondansetron Injectable 4 milliGRAM(s) IV Push every 6 hours PRN Nausea  oxyCODONE    IR 5 milliGRAM(s) Oral every 4 hours PRN Moderate Pain (4 - 6)  oxyCODONE    IR 10 milliGRAM(s) Oral every 4 hours PRN Severe Pain (7 - 10)    Allergies    No Known Allergies    SOCIAL HISTORY: no cigs social alcohol    FAMILY HISTORY: non contributory    PHYSICAL EXAM:  Daily Height in cm: 177.8 (20 Aug 2020 06:18)       Vital Signs Last 24 Hrs  T(C): 36.7 (20 Aug 2020 20:43), Max: 37.1 (20 Aug 2020 12:08)  T(F): 98 (20 Aug 2020 20:43), Max: 98.7 (20 Aug 2020 12:08)  HR: 88 (20 Aug 2020 20:43) (79 - 98)  BP: 99/64 (20 Aug 2020 20:43) (99/64 - 134/95)  BP(mean): 79 (20 Aug 2020 15:02) (79 - 90)  RR: 13 (20 Aug 2020 20:43) (8 - 18)  SpO2: 97% (20 Aug 2020 20:43) (97% - 100%)    Constitutional: WDWNF   Eyes: conj pale  ENMT: negative  Neck: supple  Breasts: not examined   Back: negative  Respiratory: clear to P&A  Cardiovascular: no MRGT or H  Gastrointestinal: normal bowel sounds  Genitourinary: neg  Rectal: not examined  Extremities: normal  Vascular: normal  Neurological: + SLR  Skin: negative  Lymph Nodes: negative  Musculoskeletal:   normal   Psychiatric: anxiety    LABS:    Urinalysis Basic - ( 20 Aug 2020 10:58 )    Color: Yellow / Appearance: Clear / S.015 / pH: x  Gluc: x / Ketone: NEGATIVE  / Bili: Negative / Urobili: 0.2 E.U./dL   Blood: x / Protein: NEGATIVE mg/dL / Nitrite: NEGATIVE   Leuk Esterase: NEGATIVE / RBC: x / WBC x   Sq Epi: x / Non Sq Epi: x / Bacteria: x

## 2020-08-21 ENCOUNTER — TRANSCRIPTION ENCOUNTER (OUTPATIENT)
Age: 40
End: 2020-08-21

## 2020-08-21 DIAGNOSIS — D62 ACUTE POSTHEMORRHAGIC ANEMIA: ICD-10-CM

## 2020-08-21 LAB
ANION GAP SERPL CALC-SCNC: 10 MMOL/L — SIGNIFICANT CHANGE UP (ref 5–17)
BASOPHILS # BLD AUTO: 0.01 K/UL — SIGNIFICANT CHANGE UP (ref 0–0.2)
BASOPHILS NFR BLD AUTO: 0.1 % — SIGNIFICANT CHANGE UP (ref 0–2)
BUN SERPL-MCNC: 13 MG/DL — SIGNIFICANT CHANGE UP (ref 7–23)
CALCIUM SERPL-MCNC: 8.8 MG/DL — SIGNIFICANT CHANGE UP (ref 8.4–10.5)
CHLORIDE SERPL-SCNC: 97 MMOL/L — SIGNIFICANT CHANGE UP (ref 96–108)
CO2 SERPL-SCNC: 28 MMOL/L — SIGNIFICANT CHANGE UP (ref 22–31)
CREAT SERPL-MCNC: 0.78 MG/DL — SIGNIFICANT CHANGE UP (ref 0.5–1.3)
EOSINOPHIL # BLD AUTO: 0 K/UL — SIGNIFICANT CHANGE UP (ref 0–0.5)
EOSINOPHIL NFR BLD AUTO: 0 % — SIGNIFICANT CHANGE UP (ref 0–6)
GLUCOSE SERPL-MCNC: 120 MG/DL — HIGH (ref 70–99)
HCT VFR BLD CALC: 30.6 % — LOW (ref 39–50)
HGB BLD-MCNC: 10.5 G/DL — LOW (ref 13–17)
IMM GRANULOCYTES NFR BLD AUTO: 0.5 % — SIGNIFICANT CHANGE UP (ref 0–1.5)
LYMPHOCYTES # BLD AUTO: 1.3 K/UL — SIGNIFICANT CHANGE UP (ref 1–3.3)
LYMPHOCYTES # BLD AUTO: 11.1 % — LOW (ref 13–44)
MCHC RBC-ENTMCNC: 29.3 PG — SIGNIFICANT CHANGE UP (ref 27–34)
MCHC RBC-ENTMCNC: 34.3 GM/DL — SIGNIFICANT CHANGE UP (ref 32–36)
MCV RBC AUTO: 85.5 FL — SIGNIFICANT CHANGE UP (ref 80–100)
MONOCYTES # BLD AUTO: 1.16 K/UL — HIGH (ref 0–0.9)
MONOCYTES NFR BLD AUTO: 9.9 % — SIGNIFICANT CHANGE UP (ref 2–14)
NEUTROPHILS # BLD AUTO: 9.2 K/UL — HIGH (ref 1.8–7.4)
NEUTROPHILS NFR BLD AUTO: 78.4 % — HIGH (ref 43–77)
NRBC # BLD: 0 /100 WBCS — SIGNIFICANT CHANGE UP (ref 0–0)
PLATELET # BLD AUTO: 120 K/UL — LOW (ref 150–400)
POTASSIUM SERPL-MCNC: 4.4 MMOL/L — SIGNIFICANT CHANGE UP (ref 3.5–5.3)
POTASSIUM SERPL-SCNC: 4.4 MMOL/L — SIGNIFICANT CHANGE UP (ref 3.5–5.3)
RBC # BLD: 3.58 M/UL — LOW (ref 4.2–5.8)
RBC # FLD: 12.5 % — SIGNIFICANT CHANGE UP (ref 10.3–14.5)
SODIUM SERPL-SCNC: 135 MMOL/L — SIGNIFICANT CHANGE UP (ref 135–145)
WBC # BLD: 11.73 K/UL — HIGH (ref 3.8–10.5)
WBC # FLD AUTO: 11.73 K/UL — HIGH (ref 3.8–10.5)

## 2020-08-21 PROCEDURE — 72100 X-RAY EXAM L-S SPINE 2/3 VWS: CPT | Mod: 26

## 2020-08-21 RX ORDER — ACETAMINOPHEN 500 MG
2 TABLET ORAL
Qty: 0 | Refills: 0 | DISCHARGE
Start: 2020-08-21

## 2020-08-21 RX ORDER — DIAZEPAM 5 MG
1 TABLET ORAL
Qty: 21 | Refills: 0
Start: 2020-08-21 | End: 2020-08-27

## 2020-08-21 RX ORDER — OXYCODONE HYDROCHLORIDE 5 MG/1
1 TABLET ORAL
Qty: 30 | Refills: 0
Start: 2020-08-21 | End: 2020-08-25

## 2020-08-21 RX ADMIN — Medication 5 MILLIGRAM(S): at 08:50

## 2020-08-21 RX ADMIN — HYDROMORPHONE HYDROCHLORIDE 0.5 MILLIGRAM(S): 2 INJECTION INTRAMUSCULAR; INTRAVENOUS; SUBCUTANEOUS at 15:35

## 2020-08-21 RX ADMIN — Medication 975 MILLIGRAM(S): at 21:51

## 2020-08-21 RX ADMIN — HYDROMORPHONE HYDROCHLORIDE 0.5 MILLIGRAM(S): 2 INJECTION INTRAMUSCULAR; INTRAVENOUS; SUBCUTANEOUS at 19:50

## 2020-08-21 RX ADMIN — OXYCODONE HYDROCHLORIDE 10 MILLIGRAM(S): 5 TABLET ORAL at 21:21

## 2020-08-21 RX ADMIN — Medication 100 MILLIGRAM(S): at 00:39

## 2020-08-21 RX ADMIN — HYDROMORPHONE HYDROCHLORIDE 0.5 MILLIGRAM(S): 2 INJECTION INTRAMUSCULAR; INTRAVENOUS; SUBCUTANEOUS at 23:59

## 2020-08-21 RX ADMIN — Medication 5 MILLIGRAM(S): at 01:38

## 2020-08-21 RX ADMIN — Medication 975 MILLIGRAM(S): at 06:23

## 2020-08-21 RX ADMIN — OXYCODONE HYDROCHLORIDE 10 MILLIGRAM(S): 5 TABLET ORAL at 12:39

## 2020-08-21 RX ADMIN — OXYCODONE HYDROCHLORIDE 10 MILLIGRAM(S): 5 TABLET ORAL at 13:20

## 2020-08-21 RX ADMIN — Medication 975 MILLIGRAM(S): at 12:39

## 2020-08-21 RX ADMIN — HYDROMORPHONE HYDROCHLORIDE 0.5 MILLIGRAM(S): 2 INJECTION INTRAMUSCULAR; INTRAVENOUS; SUBCUTANEOUS at 23:44

## 2020-08-21 RX ADMIN — Medication 975 MILLIGRAM(S): at 05:17

## 2020-08-21 RX ADMIN — OXYCODONE HYDROCHLORIDE 10 MILLIGRAM(S): 5 TABLET ORAL at 16:52

## 2020-08-21 RX ADMIN — OXYCODONE HYDROCHLORIDE 10 MILLIGRAM(S): 5 TABLET ORAL at 21:51

## 2020-08-21 RX ADMIN — ONDANSETRON 4 MILLIGRAM(S): 8 TABLET, FILM COATED ORAL at 23:46

## 2020-08-21 RX ADMIN — OXYCODONE HYDROCHLORIDE 10 MILLIGRAM(S): 5 TABLET ORAL at 17:52

## 2020-08-21 RX ADMIN — HYDROMORPHONE HYDROCHLORIDE 0.5 MILLIGRAM(S): 2 INJECTION INTRAMUSCULAR; INTRAVENOUS; SUBCUTANEOUS at 15:20

## 2020-08-21 RX ADMIN — Medication 975 MILLIGRAM(S): at 13:39

## 2020-08-21 RX ADMIN — Medication 975 MILLIGRAM(S): at 21:21

## 2020-08-21 RX ADMIN — Medication 5 MILLIGRAM(S): at 16:51

## 2020-08-21 RX ADMIN — HYDROMORPHONE HYDROCHLORIDE 0.5 MILLIGRAM(S): 2 INJECTION INTRAMUSCULAR; INTRAVENOUS; SUBCUTANEOUS at 19:35

## 2020-08-21 NOTE — DISCHARGE NOTE PROVIDER - NSDCCPTREATMENT_GEN_ALL_CORE_FT
PRINCIPAL PROCEDURE  Procedure: Laminectomy, spine, lumbar, 1-2 levels, with fusion  Findings and Treatment:

## 2020-08-21 NOTE — PHYSICAL THERAPY INITIAL EVALUATION ADULT - ADDITIONAL COMMENTS
Patient reports that he lives in an elevator building, no KRISTOPHER. Independent community ambulator at baseline. Denies having a cane or rolling walker at home. Did not require an assistive device after previous 2 spine surgeries.

## 2020-08-21 NOTE — DISCHARGE NOTE PROVIDER - NSDCCPCAREPLAN_GEN_ALL_CORE_FT
PRINCIPAL DISCHARGE DIAGNOSIS  Diagnosis: Lumbosacral stenosis  Assessment and Plan of Treatment: Lumbosacral stenosis

## 2020-08-21 NOTE — DISCHARGE NOTE PROVIDER - CARE PROVIDER_API CALL
Julio Cesar Leonard  ORTHOPAEDIC SURGERY  [address] [place] [address], [place] [address]  Phone: [phone]  Fax: [phone]  Follow Up Time:

## 2020-08-21 NOTE — CONSULT NOTE ADULT - CONSULT REASON
I called the [facility] who left the message on Friday. I informed her that I felt that this should be a APS referral and due to me not knowing the patient and Dr. Xuan Larson not seeing the patient that it would be in the best interest of the patient for a caregiver who has seen the patient to call. After reviewing SW notes from the hospital on 5/2/19 there was an APS case opened and that the SW discussed placement for the patient possibly at [facility]. At this time I am not going to go onto her care team as was stated by Dr. Xuan Larson she has not been active in this office in last year.
Back Pain
medical consultation
Back Pain

## 2020-08-21 NOTE — DISCHARGE NOTE PROVIDER - CARE PROVIDERS DIRECT ADDRESSES
[email or web address]

## 2020-08-21 NOTE — CONSULT NOTE ADULT - SUBJECTIVE AND OBJECTIVE BOX
Pain Management Progress Note - [facility] [phone]      HPI: Patient seen and examined today. Patient with a history of purpura, lumbar stenosis, microdiscectomy, cholecystectomy s/p Laminectomy, PSF L4-L5 POD#1. Patient reports lower back pain and surgical site pain, pain controlled with Dilaudid PCA overnight. Patient Axox3, denies n,v, no s/s of oversedation. Discussed plan to d/c PCA and to switch to an oral pain medication regimen. Patient verbalized understanding. Dressing intact,  Patient with x3 hemovac drains. Patient reports a good appetite today.       Pain is __x_ sharp ____dull ___burning _x__achy ___ Intensity: ____ mild _x__mod x_severe     Location _x___surgical site ____cervical __x___lumbar ____abd ____upper ext____lower ext    Worse with x____activity __x__movement _____physical therapy___ Rest    Improved with __x__medication _x___rest ____physical therapy      povidone iodine 5% Nasal Swab  chlorhexidine 2% Cloths  BUpivacaine liposome 1.3% Injectable (no eMAR)  lactated ringers.  acetaminophen   Tablet ..  ceFAZolin   IVPB  HYDROmorphone  Injectable  HYDROmorphone  Injectable  oxyCODONE    IR  oxyCODONE    IR  HYDROmorphone PCA (1 mG/mL)  HYDROmorphone PCA (1 mG/mL) Rescue Clinician Bolus  naloxone Injectable  ondansetron Injectable  diazepam    Tablet      ROS: Const:  _-__febrile   Eyes:___ENT:___CV: -__chest pain  Resp: _-__sob  GI:_-__nausea __-vomiting _-__abd pain _x__npo ___clears __full diet __bm  :___ Musk: _x__pain _x__spasm  Skin:___ Neuro:  _-__piwkvbdh_-__vpqsbjkxr_-__ numbness _-__weakness _-__paresth  Psych:__anxiety  Endo:___ Heme:___Allergy:_________, _x__all others reviewed and negative      PAST MEDICAL & SURGICAL HISTORY:  Purpura, idiopathic thrombocytopenia  Lumbosacral stenosis  History of microdiscectomy  History of cholecystectomy      08-21 @ 07:27458 mL/min/1.73M2      Hemoglobin: 10.5 g/dL (08-21 @ 07:06)  Hemoglobin: 11.7 g/dL (08-20 @ 22:41)        T(C): 36.7 (08-21-20 @ 08:45), Max: 36.8 (08-20-20 @ 17:18)  HR: 80 (08-21-20 @ 08:45) (80 - 97)  BP: 110/64 (08-21-20 @ 08:45) (99/64 - 111/70)  RR: 15 (08-21-20 @ 08:45) (10 - 17)  SpO2: 99% (08-21-20 @ 08:45) (97% - 99%)  Wt(kg): --      PHYSICAL EXAM:  Gen Appearance: _x__no acute distress _x__appropriate        Neuro: _x__SILT feet____ EOM Intact Psych: AAOX_3_, _x__mood/affect appropriate        Eyes: _x__conjunctiva WNL  ___x__ Pupils equal and round        ENT: _x__ears and nose atraumatic__x_ Hearing grossly intact        Neck: _x__trachea midline, no visible masses ___thyroid without palpable mass    Resp: x___Nml WOB____No tactile fremitus ___clear to auscultation    Cardio: x___extremities free from edema ____pedal pulses palpable    GI/Abdomen: _x__soft __x___ Nontender__x____Nondistended_____HSM    Lymphatic: ___no palpable nodes in neck  ___no palpable nodes calves and feet    Skin/Wound: ___Incision, _x__Dressing c/d/i,   ____surrounding tissues soft,  _x__drain/chest tube present____    Muscular: EHL _5__/5  Gastrocnemius__5_/5    ___absent clubbing/cyanosis        ASSESSMENT: This is a 40y old Male with a history of purpura, lumbar stenosis, microdiscectomy, cholecystectomy s/p Laminectomy, PSF L4-L5 POD#1, pain controlled with current pain medication regimen.       Recommended Treatment PLAN:  1. D/c PCA  2. Dilaudid 0.5mg Q2h IVP prn breakthrough pain  3. Diazepam 5mg PO Q8h prn muscle spasms  Plan discussed with Dr. English at bedside

## 2020-08-21 NOTE — PHYSICAL THERAPY INITIAL EVALUATION ADULT - MANUAL MUSCLE TESTING RESULTS, REHAB EVAL
(B) UE grossly >3+/5 upon functional assessment. LE MMT assessment all 5/5 (B) for each of the following: hip flex, knee ext, ankle DF, ankle PF, hallux ext.

## 2020-08-21 NOTE — DISCHARGE NOTE PROVIDER - HOSPITAL COURSE
Admitted    Surgery    Lily-op Antibiotics    Pain control    DVT prophylaxis    OOB/Physical Therapy

## 2020-08-21 NOTE — DISCHARGE NOTE PROVIDER - NSDCMRMEDTOKEN_GEN_ALL_CORE_FT
acetaminophen 325 mg oral tablet: 2 tab(s) orally every 6 hours, As Needed for mild pain or fever  diazePAM 5 mg oral tablet: 1 tab(s) orally every 8 hours, As needed, muscle spasms MDD:3  oxyCODONE 5 mg oral tablet: 1-2 tab(s) orally every 4 hours, As needed, severe pain MDD:6

## 2020-08-21 NOTE — DISCHARGE NOTE PROVIDER - NSDCFUADDINST_GEN_ALL_CORE_FT
No strenuous activity (bending/twisting), heavy lifting, driving or returning to work until cleared by MD.  Change dressing daily with gauze/tape till post-op day #5, then leave incision open to air.  You may shower post-op day#5, keep incision clean and dry.   Try to have regular bowel movements, take stool softener or laxative if necessary.  May take pepcid or zantac for upset stomach.  May apply ice to affected areas to decrease swelling.  Call to schedule an appt with  ________ for follow up, if you have staples or sutures they will be removed in office.  Contact your doctor if you experience: fever greater than 101.5, chills, chest pain, difficulty breathing, redness or excessive drainage around the incision, other concerns.  Follow up with your primary care provider.

## 2020-08-21 NOTE — PHYSICAL THERAPY INITIAL EVALUATION ADULT - PERTINENT HX OF CURRENT PROBLEM, REHAB EVAL
40M with a history of anterior and posterior fusion of L5-S1 in 2017 with Dr. Leonard. Presents today for elective L4-L5 Posterior Spinal Fusion.

## 2020-08-21 NOTE — PHYSICAL THERAPY INITIAL EVALUATION ADULT - GENERAL OBSERVATIONS, REHAB EVAL
Received supine in bed with + IV/PCA, hemovac x 3, pedraza catheter. Patient in no apparent distress.

## 2020-08-22 ENCOUNTER — TRANSCRIPTION ENCOUNTER (OUTPATIENT)
Age: 40
End: 2020-08-22

## 2020-08-22 VITALS
TEMPERATURE: 98 F | SYSTOLIC BLOOD PRESSURE: 106 MMHG | DIASTOLIC BLOOD PRESSURE: 72 MMHG | OXYGEN SATURATION: 99 % | RESPIRATION RATE: 18 BRPM | HEART RATE: 100 BPM

## 2020-08-22 LAB
ANION GAP SERPL CALC-SCNC: 6 MMOL/L — SIGNIFICANT CHANGE UP (ref 5–17)
BASOPHILS # BLD AUTO: 0.04 K/UL — SIGNIFICANT CHANGE UP (ref 0–0.2)
BASOPHILS NFR BLD AUTO: 0.5 % — SIGNIFICANT CHANGE UP (ref 0–2)
BUN SERPL-MCNC: 10 MG/DL — SIGNIFICANT CHANGE UP (ref 7–23)
CALCIUM SERPL-MCNC: 8.6 MG/DL — SIGNIFICANT CHANGE UP (ref 8.4–10.5)
CHLORIDE SERPL-SCNC: 99 MMOL/L — SIGNIFICANT CHANGE UP (ref 96–108)
CO2 SERPL-SCNC: 32 MMOL/L — HIGH (ref 22–31)
CREAT SERPL-MCNC: 0.86 MG/DL — SIGNIFICANT CHANGE UP (ref 0.5–1.3)
EOSINOPHIL # BLD AUTO: 0.17 K/UL — SIGNIFICANT CHANGE UP (ref 0–0.5)
EOSINOPHIL NFR BLD AUTO: 2.3 % — SIGNIFICANT CHANGE UP (ref 0–6)
GLUCOSE SERPL-MCNC: 103 MG/DL — HIGH (ref 70–99)
HCT VFR BLD CALC: 26.8 % — LOW (ref 39–50)
HGB BLD-MCNC: 9.3 G/DL — LOW (ref 13–17)
IMM GRANULOCYTES NFR BLD AUTO: 0.4 % — SIGNIFICANT CHANGE UP (ref 0–1.5)
LYMPHOCYTES # BLD AUTO: 2.1 K/UL — SIGNIFICANT CHANGE UP (ref 1–3.3)
LYMPHOCYTES # BLD AUTO: 27.9 % — SIGNIFICANT CHANGE UP (ref 13–44)
MCHC RBC-ENTMCNC: 29.5 PG — SIGNIFICANT CHANGE UP (ref 27–34)
MCHC RBC-ENTMCNC: 34.7 GM/DL — SIGNIFICANT CHANGE UP (ref 32–36)
MCV RBC AUTO: 85.1 FL — SIGNIFICANT CHANGE UP (ref 80–100)
MONOCYTES # BLD AUTO: 0.88 K/UL — SIGNIFICANT CHANGE UP (ref 0–0.9)
MONOCYTES NFR BLD AUTO: 11.7 % — SIGNIFICANT CHANGE UP (ref 2–14)
NEUTROPHILS # BLD AUTO: 4.32 K/UL — SIGNIFICANT CHANGE UP (ref 1.8–7.4)
NEUTROPHILS NFR BLD AUTO: 57.2 % — SIGNIFICANT CHANGE UP (ref 43–77)
NRBC # BLD: 0 /100 WBCS — SIGNIFICANT CHANGE UP (ref 0–0)
PLATELET # BLD AUTO: 102 K/UL — LOW (ref 150–400)
POTASSIUM SERPL-MCNC: 4.1 MMOL/L — SIGNIFICANT CHANGE UP (ref 3.5–5.3)
POTASSIUM SERPL-SCNC: 4.1 MMOL/L — SIGNIFICANT CHANGE UP (ref 3.5–5.3)
RBC # BLD: 3.15 M/UL — LOW (ref 4.2–5.8)
RBC # FLD: 12.5 % — SIGNIFICANT CHANGE UP (ref 10.3–14.5)
SODIUM SERPL-SCNC: 137 MMOL/L — SIGNIFICANT CHANGE UP (ref 135–145)
WBC # BLD: 7.54 K/UL — SIGNIFICANT CHANGE UP (ref 3.8–10.5)
WBC # FLD AUTO: 7.54 K/UL — SIGNIFICANT CHANGE UP (ref 3.8–10.5)

## 2020-08-22 RX ADMIN — HYDROMORPHONE HYDROCHLORIDE 0.5 MILLIGRAM(S): 2 INJECTION INTRAMUSCULAR; INTRAVENOUS; SUBCUTANEOUS at 08:05

## 2020-08-22 RX ADMIN — OXYCODONE HYDROCHLORIDE 10 MILLIGRAM(S): 5 TABLET ORAL at 15:30

## 2020-08-22 RX ADMIN — Medication 975 MILLIGRAM(S): at 14:40

## 2020-08-22 RX ADMIN — Medication 975 MILLIGRAM(S): at 14:26

## 2020-08-22 RX ADMIN — OXYCODONE HYDROCHLORIDE 10 MILLIGRAM(S): 5 TABLET ORAL at 14:30

## 2020-08-22 RX ADMIN — HYDROMORPHONE HYDROCHLORIDE 0.5 MILLIGRAM(S): 2 INJECTION INTRAMUSCULAR; INTRAVENOUS; SUBCUTANEOUS at 11:43

## 2020-08-22 RX ADMIN — HYDROMORPHONE HYDROCHLORIDE 0.5 MILLIGRAM(S): 2 INJECTION INTRAMUSCULAR; INTRAVENOUS; SUBCUTANEOUS at 07:50

## 2020-08-22 RX ADMIN — Medication 975 MILLIGRAM(S): at 05:28

## 2020-08-22 RX ADMIN — Medication 975 MILLIGRAM(S): at 05:58

## 2020-08-22 RX ADMIN — HYDROMORPHONE HYDROCHLORIDE 0.5 MILLIGRAM(S): 2 INJECTION INTRAMUSCULAR; INTRAVENOUS; SUBCUTANEOUS at 11:28

## 2020-08-22 RX ADMIN — Medication 5 MILLIGRAM(S): at 05:28

## 2020-08-22 NOTE — DISCHARGE NOTE NURSING/CASE MANAGEMENT/SOCIAL WORK - PATIENT PORTAL LINK FT
You can access the FollowMyHealth Patient Portal offered by [facility] by registering at the following website: [email or web address]. By joining [employer]’s FollowMyHealth portal, you will also be able to view your health information using other applications (apps) compatible with our system.

## 2020-08-22 NOTE — PROGRESS NOTE ADULT - SUBJECTIVE AND OBJECTIVE BOX
Pain Management Progress Note - [facility] [phone]      HPI: Patient seen and examined today. Patient with a history of purpura, lumbar stenosis, microdiscectomy, cholecystectomy s/p Laminectomy, PSF L4-L5 POD#1. Patient reports lower back pain and surgical site pain, pain managed with Dilaudid PCA overnight. Patient Axox3, denies any secondary side effects from current pain medication regimen. Discussed plan to d/c Dilaudid PCA and to switch to an oral pain medication regimen. Patient verbalized understanding. Patient dressing c,d,i with x3 hemovac drains. Patient reports good appetite today.       Pain is __x_ sharp ____dull ___burning _x__achy ___ Intensity: ____ mild _x__mod x_severe     Location _x___surgical site ____cervical ___x__lumbar ____abd ____upper ext____lower ext    Worse with x____activity __x__movement _____physical therapy___ Rest    Improved with __x__medication _x___rest ____physical therapy        povidone iodine 5% Nasal Swab  chlorhexidine 2% Cloths  BUpivacaine liposome 1.3% Injectable (no eMAR)  lactated ringers.  acetaminophen   Tablet ..  ceFAZolin   IVPB  HYDROmorphone  Injectable  HYDROmorphone  Injectable  oxyCODONE    IR  oxyCODONE    IR  HYDROmorphone PCA (1 mG/mL)  HYDROmorphone PCA (1 mG/mL) Rescue Clinician Bolus  naloxone Injectable  ondansetron Injectable  diazepam    Tablet      ROS: Const:  _-__febrile   Eyes:___ENT:___CV: -__chest pain  Resp: _-__sob  GI:_-__nausea __-vomiting _-__abd pain __npo ___clears x__full diet __bm  :___ Musk: _x__pain _x__spasm  Skin:___ Neuro:  _-__qinxrjww_-__uumqacaak_-__ numbness _-__weakness _-__paresth  Psych:__anxiety  Endo:___ Heme:___Allergy:_________, _x__all others reviewed and negative      PAST MEDICAL & SURGICAL HISTORY:  Purpura, idiopathic thrombocytopenia  Lumbosacral stenosis  History of microdiscectomy  History of cholecystectomy      08-21 @ 07:14208 mL/min/1.73M2      Hemoglobin: 10.5 g/dL (08-21 @ 07:06)  Hemoglobin: 11.7 g/dL (08-20 @ 22:41)        T(C): 36.7 (08-21-20 @ 08:45), Max: 36.8 (08-20-20 @ 17:18)  HR: 80 (08-21-20 @ 08:45) (80 - 97)  BP: 110/64 (08-21-20 @ 08:45) (99/64 - 111/70)  RR: 15 (08-21-20 @ 08:45) (10 - 17)  SpO2: 99% (08-21-20 @ 08:45) (97% - 99%)  Wt(kg): --       PHYSICAL EXAM:  Gen Appearance: _x__no acute distress _x__appropriate        Neuro: _x__SILT feet____ EOM Intact Psych: AAOX_3_, _x__mood/affect appropriate        Eyes: _x__conjunctiva WNL  ___x__ Pupils equal and round        ENT: _x__ears and nose atraumatic__x_ Hearing grossly intact        Neck: _x__trachea midline, no visible masses ___thyroid without palpable mass    Resp: x___Nml WOB____No tactile fremitus ___clear to auscultation    Cardio: x___extremities free from edema ____pedal pulses palpable    GI/Abdomen: _x__soft __x___ Nontender__x____Nondistended_____HSM    Lymphatic: ___no palpable nodes in neck  ___no palpable nodes calves and feet    Skin/Wound: ___Incision, _x__Dressing c/d/i,   ____surrounding tissues soft,  _x__drain/chest tube present____    Muscular: EHL _5__/5  Gastrocnemius__5_/5    ___absent clubbing/cyanosis        ASSESSMENT: This is a 40y old Male with a history of purpura, lumbar stenosis, microdiscectomy, cholecystectomy s/p Laminectomy, PSF L4-L5 POD#1, pain controlled with current pain medication regimen.       Recommended Treatment PLAN:  1. D/c PCA  2. Oxycodone 5-10mg PO Q4h prn moderate to severe pain  3. Can increase to Oxycodone 10-15mg PO Q4h prn moderate to severe pain  4. Dilaudid 0.5mg Q2h IVP prn breakthrough pain  5. Diazepam 5mg PO Q8h prn muscle spasms  Plan discussed with Dr. English
HPI:  40 year old male with moderate chronic low back increased over time.  MRI confirmed spinal stenosis lumbar spine.  Patient has had conservative treatment but still symptomatic.  Symptoms worse with stairs and with twisting motions.  He has a history of anterior and posterior fusion of L5-S1 in 2017 with Dr. Leonard.  Presents today for elective L4-L5 Posterior Spinal Fusion.  Pain radiates to both legs but right > left.  Also admits to tingling of legs (right > left).   No weakness of LE. No GI/ incontinence. Patient day #1 s/p lumbar laminectomy and fusion with moderate low back pain and malaise.    PAST MEDICAL & SURGICAL HISTORY:  Purpura, idiopathic thrombocytopenia  Denies DM HBP PUD ASTHMA  allergic rhinitis  Lumbosacral stenosis  History of microdiscectomy  History of cholecystectomy    MEDICATIONS  (STANDING):  acetaminophen   Tablet .. 975 milliGRAM(s) Oral every 8 hours  BUpivacaine liposome 1.3% Injectable (no eMAR) 20 milliLiter(s) Local Injection once  HYDROmorphone PCA (1 mG/mL) 30 milliLiter(s) PCA Continuous PCA Continuous  lactated ringers. 1000 milliLiter(s) (100 mL/Hr) IV Continuous <Continuous>    MEDICATIONS  (PRN):  diazepam    Tablet 5 milliGRAM(s) Oral every 8 hours PRN muscle spasms  HYDROmorphone  Injectable 0.5 milliGRAM(s) IV Push every 4 hours PRN breakthrough pain  HYDROmorphone PCA (1 mG/mL) Rescue Clinician Bolus 0.5 milliGRAM(s) IV Push every 2 hours PRN breakthrough pain  naloxone Injectable 0.1 milliGRAM(s) IV Push every 3 minutes PRN For ANY of the following changes in patient status:  A. RR LESS THAN 10 breaths per minute, B. Oxygen saturation LESS THAN 90%, C. Sedation score of 6  ondansetron Injectable 4 milliGRAM(s) IV Push every 6 hours PRN Nausea  oxyCODONE    IR 5 milliGRAM(s) Oral every 4 hours PRN Moderate Pain (4 - 6)  oxyCODONE    IR 10 milliGRAM(s) Oral every 4 hours PRN Severe Pain (7 - 10)    Allergies    No Known Allergies    REVIEW OF SYSTEMS    General:  malaise	  Skin/Breast: normal  Ophthalmologic: negative  ENMT:	normal  Respiratory and Thorax: normal  Cardiovascular:	normal  Gastrointestinal:	normal  Genitourinary:	normal  Musculoskeletal:	 normal  Neurological:	low back pain  Psychiatric:	anxiety  Hematology/Lymphatics:	 negative  Endocrine:	negative  Allergic/Immunologic:	negative    PHYSICAL EXAM:     Vital Signs Last 24 Hrs  T(C): 36.5 (21 Aug 2020 05:22), Max: 37.1 (20 Aug 2020 12:08)  T(F): 97.7 (21 Aug 2020 05:22), Max: 98.7 (20 Aug 2020 12:08)  HR: 82 (21 Aug 2020 05:22) (82 - 98)  BP: 102/62 (21 Aug 2020 05:22) (99/64 - 117/72)  BP(mean): 79 (20 Aug 2020 15:02) (79 - 90)  RR: 17 (21 Aug 2020 05:22) (8 - 18)  SpO2: 97% (21 Aug 2020 05:22) (97% - 100%)    Constitutional: WDWNM   Eyes: conj pale  ENMT: negative  Neck: supple  Breasts: not examined   Back: + SLR  Respiratory: clear to P&A  Cardiovascular: no MRGT or H  Gastrointestinal: normal bowel sounds  Genitourinary: neg  Rectal: not examined  Extremities: normal  Vascular: normal  Neurological: normal  Skin: negative  Lymph Nodes: negative  Musculoskeletal:   normal  Psychiatric: anxiety  Labs:                        11.7   14.84 )-----------( 126      ( 20 Aug 2020 22:41 )             33.5
HPI:  40 year old white male with moderate chronic low back increased over time.  MRI confirmed spinal stenosis lumbar spine.  Patient has had conservative treatment but still symptomatic.  Symptoms worse with stairs and with twisting motions.  He has a history of anterior and posterior fusion of L5-S1 in 2017 with Dr. Leonard.   Pain radiates to both legs but right > left.  Also admits to tingling of legs (right > left). No weakness of LE. No GI/ incontinence. Patient day #2 s/p lumbar laminectomy and fusion with moderate low back pain and malaise.    PAST MEDICAL & SURGICAL HISTORY:  Purpura, idiopathic thrombocytopenia  Denies HBP PUD ASTHMA  allergic rhinitis  Lumbosacral stenosis  History of microdiscectomy  History of cholecystectomy    MEDICATIONS  (STANDING):  acetaminophen   Tablet .. 975 milliGRAM(s) Oral every 8 hours  BUpivacaine liposome 1.3% Injectable (no eMAR) 20 milliLiter(s) Local Injection once  lactated ringers. 1000 milliLiter(s) (100 mL/Hr) IV Continuous <Continuous>    MEDICATIONS  (PRN):  diazepam    Tablet 5 milliGRAM(s) Oral every 8 hours PRN muscle spasms  HYDROmorphone  Injectable 0.5 milliGRAM(s) IV Push every 4 hours PRN breakthrough pain  naloxone Injectable 0.1 milliGRAM(s) IV Push every 3 minutes PRN For ANY of the following changes in patient status:  A. RR LESS THAN 10 breaths per minute, B. Oxygen saturation LESS THAN 90%, C. Sedation score of 6  ondansetron Injectable 4 milliGRAM(s) IV Push every 6 hours PRN Nausea  oxyCODONE    IR 5 milliGRAM(s) Oral every 4 hours PRN Moderate Pain (4 - 6)  oxyCODONE    IR 10 milliGRAM(s) Oral every 4 hours PRN Severe Pain (7 - 10)    Allergies    No Known Allergies     REVIEW OF SYSTEMS    General:  malaise	  Skin/Breast: normal  Ophthalmologic: negative  ENMT:	normal  Respiratory and Thorax: normal  Cardiovascular:	normal  Gastrointestinal:	normal  Genitourinary:	normal  Musculoskeletal:	 low back pain  Neurological:	normal  Psychiatric:	anxiety  Hematology/Lymphatics:	 negative  Endocrine:	negative  Allergic/Immunologic:	negative    PHYSICAL EXAM:     Vital Signs Last 24 Hrs  T(C): 36.6 (22 Aug 2020 09:44), Max: 36.9 (21 Aug 2020 15:36)  T(F): 97.9 (22 Aug 2020 09:44), Max: 98.5 (21 Aug 2020 15:36)  HR: 100 (22 Aug 2020 09:44) (83 - 100)  BP: 106/72 (22 Aug 2020 09:44) (105/64 - 121/77)  BP(mean): --  RR: 18 (22 Aug 2020 09:44) (15 - 18)  SpO2: 99% (22 Aug 2020 09:44) (99% - 100%)    Constitutional: WDWNWM  Eyes: conj pale  ENMT: negative  Neck: supple  Breasts: not examined   Back: + SLR  Respiratory: clear to P&A  Cardiovascular: no MRGT or H  Gastrointestinal: normal bowel sounds  Genitourinary: neg  Rectal: not examined  Extremities: normal  Neurological: normal  Skin: negative  Lymph Nodes: negative  Musculoskeletal:   normal  Psychiatric: anxiety    LABS:                       9.3    7.54  )-----------( 102      ( 22 Aug 2020 05:49 )             26.8       08-22    137  |  99  |  10  ----------------------------<  103<H>  4.1   |  32<H>  |  0.86    Ca    8.6      22 Aug 2020 05:49
Ortho Note    Pt comfortable without complaints, pain controlled.  Denies CP, SOB, N/V, numbness/tingling down le b/l.    Vital Signs Last 24 Hrs  T(C): 36.7 (08-21-20 @ 08:45), Max: 36.7 (08-21-20 @ 08:45)  T(F): 98 (08-21-20 @ 08:45), Max: 98 (08-21-20 @ 08:45)  HR: 80 (08-21-20 @ 08:45) (80 - 82)  BP: 110/64 (08-21-20 @ 08:45) (102/62 - 110/64)  BP(mean): --  RR: 15 (08-21-20 @ 08:45) (15 - 17)  SpO2: 99% (08-21-20 @ 08:45) (97% - 99%)      General: Pt Alert and oriented, NAD  DSG medipore C/D/I HVx3 in place  Pulses:  DPs palpable b/l le   Sensation:  SILT throughout distal le b/l   Motor: EHL/FHL/TA/GS 5/5 b/l                           10.5   11.73 )-----------( 120      ( 21 Aug 2020 07:06 )             30.6   21 Aug 2020 07:06    135    |  97     |  13     ----------------------------<  120    4.4     |  28     |  0.78     Ca    8.8        21 Aug 2020 07:06    Drain output:  A HV 5cc overnight 30cc in 24 hrs  B HV 3cc overnight 3cc in 24 hrs  C HV 30cc overnight 70cc in 24hrs    A/P: 40yMale POD#1 s/p revision lami L4-L5 extension of fusion L4-S1  - Stable  - Pain Control as per PM  - DVT ppx:  scds  - PT, WBS: WBAT le b/l   - Pt cleared PT  - continue to monitor drain outupt  - f/u postop xray  - milo removed f/u TOV  - dc home pending drain output     Ortho Pager [phone]

## 2020-08-24 PROCEDURE — 81003 URINALYSIS AUTO W/O SCOPE: CPT

## 2020-08-24 PROCEDURE — 97161 PT EVAL LOW COMPLEX 20 MIN: CPT

## 2020-08-24 PROCEDURE — 85027 COMPLETE CBC AUTOMATED: CPT

## 2020-08-24 PROCEDURE — 72100 X-RAY EXAM L-S SPINE 2/3 VWS: CPT

## 2020-08-24 PROCEDURE — 95940 IONM IN OPERATNG ROOM 15 MIN: CPT

## 2020-08-24 PROCEDURE — 85025 COMPLETE CBC W/AUTO DIFF WBC: CPT

## 2020-08-24 PROCEDURE — 86850 RBC ANTIBODY SCREEN: CPT

## 2020-08-24 PROCEDURE — 36415 COLL VENOUS BLD VENIPUNCTURE: CPT

## 2020-08-24 PROCEDURE — 86901 BLOOD TYPING SEROLOGIC RH(D): CPT

## 2020-08-24 PROCEDURE — 76000 FLUOROSCOPY <1 HR PHYS/QHP: CPT

## 2020-08-24 PROCEDURE — 80048 BASIC METABOLIC PNL TOTAL CA: CPT

## 2020-08-24 PROCEDURE — C1889: CPT

## 2020-08-24 PROCEDURE — C1713: CPT
